# Patient Record
Sex: FEMALE | Race: WHITE
[De-identification: names, ages, dates, MRNs, and addresses within clinical notes are randomized per-mention and may not be internally consistent; named-entity substitution may affect disease eponyms.]

---

## 2017-09-27 ENCOUNTER — HOSPITAL ENCOUNTER (OUTPATIENT)
Dept: HOSPITAL 75 - ONC | Age: 42
LOS: 3 days | Discharge: HOME | End: 2017-09-30
Attending: INTERNAL MEDICINE
Payer: COMMERCIAL

## 2017-09-27 LAB
%SAT TOTAL IRON BINDING CAPIC: 3 % (ref 15–50)
ALBUMIN SERPL-MCNC: 3.9 GM/DL (ref 3.2–4.5)
ALT SERPL-CCNC: 10 U/L (ref 0–55)
ANION GAP SERPL CALC-SCNC: 10 MMOL/L (ref 5–14)
AST SERPL-CCNC: 8 U/L (ref 5–34)
BASOPHILS # BLD AUTO: 0 10^3/UL (ref 0–0.1)
BASOPHILS NFR BLD AUTO: 0 % (ref 0–10)
BILIRUB SERPL-MCNC: 0.3 MG/DL (ref 0.1–1)
BUN SERPL-MCNC: 16 MG/DL (ref 7–18)
BUN/CREAT SERPL: 20
CALCIUM SERPL-MCNC: 9.2 MG/DL (ref 8.5–10.1)
CHLORIDE SERPL-SCNC: 103 MMOL/L (ref 98–107)
CO2 SERPL-SCNC: 23 MMOL/L (ref 21–32)
CREAT SERPL-MCNC: 0.81 MG/DL (ref 0.6–1.3)
EOSINOPHIL # BLD AUTO: 0.2 10^3/UL (ref 0–0.3)
EOSINOPHIL NFR BLD AUTO: 3 % (ref 0–10)
ERYTHROCYTE [DISTWIDTH] IN BLOOD BY AUTOMATED COUNT: 17.2 % (ref 10–14.5)
GFR SERPLBLD BASED ON 1.73 SQ M-ARVRAT: > 60 ML/MIN
GLUCOSE SERPL-MCNC: 205 MG/DL (ref 70–105)
LYMPHOCYTES # BLD AUTO: 1.3 X 10^3 (ref 1–4)
LYMPHOCYTES NFR BLD AUTO: 17 % (ref 12–44)
MCH RBC QN AUTO: 20 PG (ref 25–34)
MCHC RBC AUTO-ENTMCNC: 29 G/DL (ref 32–36)
MCV RBC AUTO: 68 FL (ref 80–99)
MONOCYTES # BLD AUTO: 0.6 X 10^3 (ref 0–1)
MONOCYTES NFR BLD AUTO: 7 % (ref 0–12)
NEUTROPHILS # BLD AUTO: 5.9 X 10^3 (ref 1.8–7.8)
NEUTROPHILS NFR BLD AUTO: 73 % (ref 42–75)
PLATELET # BLD: 362 10^3/UL (ref 130–400)
PMV BLD AUTO: 9.5 FL (ref 7.4–10.4)
POTASSIUM SERPL-SCNC: 4.1 MMOL/L (ref 3.6–5)
PROT SERPL-MCNC: 7.1 GM/DL (ref 6.4–8.2)
RBC # BLD AUTO: 3.51 10^6/UL (ref 4.35–5.85)
SODIUM SERPL-SCNC: 136 MMOL/L (ref 135–145)
TIBC SERPL-MCNC: 390 UG/DL (ref 280–380)
WBC # BLD AUTO: 8.1 10^3/UL (ref 4.3–11)

## 2017-09-27 PROCEDURE — 36415 COLL VENOUS BLD VENIPUNCTURE: CPT

## 2017-09-27 PROCEDURE — 85025 COMPLETE CBC W/AUTO DIFF WBC: CPT

## 2017-09-27 PROCEDURE — 99214 OFFICE O/P EST MOD 30 MIN: CPT

## 2017-09-27 PROCEDURE — 80053 COMPREHEN METABOLIC PANEL: CPT

## 2017-09-27 PROCEDURE — 82728 ASSAY OF FERRITIN: CPT

## 2017-09-27 PROCEDURE — 86304 IMMUNOASSAY TUMOR CA 125: CPT

## 2017-09-27 PROCEDURE — 83540 ASSAY OF IRON: CPT

## 2017-09-28 LAB
FERRITIN SERPL-MCNC: 14 NG/ML (ref 15–150)
UIBC SERPL-MCNC: 380 UG/DL (ref 55–450)

## 2017-10-05 ENCOUNTER — HOSPITAL ENCOUNTER (OUTPATIENT)
Dept: HOSPITAL 75 - PREOP | Age: 42
End: 2017-10-05
Attending: SURGERY
Payer: MEDICAID

## 2017-10-05 VITALS — HEIGHT: 67 IN | WEIGHT: 283 LBS | BODY MASS INDEX: 44.42 KG/M2

## 2017-10-05 DIAGNOSIS — Z01.818: Primary | ICD-10-CM

## 2017-10-05 DIAGNOSIS — C53.9: ICD-10-CM

## 2017-10-06 ENCOUNTER — HOSPITAL ENCOUNTER (OUTPATIENT)
Dept: HOSPITAL 75 - SDC | Age: 42
Discharge: HOME | End: 2017-10-06
Attending: SURGERY
Payer: MEDICAID

## 2017-10-06 VITALS — DIASTOLIC BLOOD PRESSURE: 91 MMHG | SYSTOLIC BLOOD PRESSURE: 143 MMHG

## 2017-10-06 VITALS — SYSTOLIC BLOOD PRESSURE: 145 MMHG | DIASTOLIC BLOOD PRESSURE: 83 MMHG

## 2017-10-06 VITALS — HEIGHT: 67 IN | WEIGHT: 283 LBS | BODY MASS INDEX: 44.42 KG/M2

## 2017-10-06 VITALS — SYSTOLIC BLOOD PRESSURE: 127 MMHG | DIASTOLIC BLOOD PRESSURE: 72 MMHG

## 2017-10-06 DIAGNOSIS — F17.210: ICD-10-CM

## 2017-10-06 DIAGNOSIS — E66.01: ICD-10-CM

## 2017-10-06 DIAGNOSIS — C53.9: Primary | ICD-10-CM

## 2017-10-06 DIAGNOSIS — D64.9: ICD-10-CM

## 2017-10-06 DIAGNOSIS — G47.33: ICD-10-CM

## 2017-10-06 DIAGNOSIS — D23.71: ICD-10-CM

## 2017-10-06 DIAGNOSIS — E11.9: ICD-10-CM

## 2017-10-06 DIAGNOSIS — Z79.899: ICD-10-CM

## 2017-10-06 PROCEDURE — 84703 CHORIONIC GONADOTROPIN ASSAY: CPT

## 2017-10-06 PROCEDURE — 71010: CPT

## 2017-10-06 PROCEDURE — 87081 CULTURE SCREEN ONLY: CPT

## 2017-10-06 NOTE — DIAGNOSTIC IMAGING REPORT
CLINICAL INDICATION: Postop Groshong placement.



EXAM: Portable chest x-ray upright view.



COMPARISONS: None.



FINDINGS:

There is a Groshong central line seen overlying right chest with

tip in the mid superior vena cava.



Lungs/pleura: There is a curvilinear amorphous area of increased

density overlying the periphery of the left midlung field which

appears to extend outside the confines of the chest wall that may

be external to patient and representing artifact.



Otherwise, lungs are clear. There is no pneumothorax. There is no

pleural effusion.



Mediastinum: Unremarkable. 



Pulmonary vasculature: Unremarkable.



Heart: Unremarkable.



Bones/extrathoracic soft tissue: Unremarkable.



IMPRESSION:

1: There is a Groshong central line catheter overlying right

chest with tip in the mid superior vena cava. There is no

pneumothorax.



2: There is no radiographic evidence of acute cardiopulmonary

process.



3: There is a curvilinear amorphous area of increased density

overlying the periphery of the left mid lung field, which appears

to extend outside the confines of the chest wall that may be

external to patient and represented artifact.



Dictated by: 



  Dictated on workstation # [id number]

## 2017-10-06 NOTE — PROGRESS NOTE-POST OPERATIVE
Post-Operative Progess Note


Surgeon (s)/Assistant (s)


Surgeon


EDWIN RUSSELL DO


Assistant:  na





Pre-Operative Diagnosis


cervical cancer, right lower ext skin lesion





Post-Operative Diagnosis





same





Procedure & Operative Findings


Date of Procedure


10/6/17


Procedure Performed/Findings


port placement u/s guidance right IJ, excision skin lesion right lower 

extremity 2.5x1.5 cm


Anesthesia Type


mac c local





Estimated Blood Loss


Estimated blood loss (mL):  min





Specimens/Packing


Specimens Removed


skin lesion short suture superior long suture EDWIN Rivera DO Oct 6, 2017 08:53

## 2017-10-06 NOTE — DIAGNOSTIC IMAGING REPORT
Intraoperative view of the chest.



INDICATION: Port placement.



Fluoroscopic time provided is 48 seconds.



IMPRESSION: Provided image demonstrates a right IJ port is seen

with tip at the SVC level.



Dictated by: 



  Dictated on workstation # [id number]

## 2017-10-06 NOTE — XMS REPORT
Patient Summary (HL7 CCD)

 Created on: 02/15/2017



VIANNEY CUNHA

External Reference #: [id number]

: 1975

Sex: Female



Demographics







 Address  [address] [place]  [address]

 

 Home Phone  [phone]

 

 Preferred Language  Unknown

 

 Marital Status  Unknown

 

 [employer] Affiliation  Unknown

 

 Race  White

 

 Ethnic Group  Not  or 





Author







 Author  BRIANA RODRIGUEZ  Unknown

 

 Address  [address]

[place]  [address]



 

 Phone  [phone]







Care Team Providers







 Care Team Member Name  Role  Phone

 

 SERGEI DOUGLAS, CB UMAÑA  Attphys  [phone]

 

 SERGEI DOUGLAS, CB Miguel  [phone]



                                                                



Allergies

          





 Allergy        Code        Allergy Type        Reaction        Status    

 

 No Known Drug Allergies        0        Drug allergy                 Active    



                                                                               
         



Active Medications

          Unknown or Not Available.                                            
                                            



Problems

          Unknown or Not Available.                                            
                                            



Procedures

          





 Procedure        Code        Procedure Type        Date    

 

 SHOULDER MINIMUM 2 VIEWS        [id number]        SNOMED CT        10/21/2016    

 

 BEDSIDE GLUCOSE        [id number]        SNOMED CT        10/21/2016    



                                                                               
                   



Results

          





 BEDSIDE GLUCOSE - Collect Date/Time: 10/21/2016 01:41     

 

 Test Name        Code        Test Result        Test Units        Test Ref 
Range    

 

 GLUCOSE POCT                 181        MG/DL        L=70       H=100    



                                                                               
         



Function Status

          Unknown or Not Available.                                            
                                  



History of Immunizations

          Unknown or Not Available.                                            
                        



Plan of Treatment

          Unknown or Not Available.                                            
                        



Social History

          





 Smoking Status        Code        Start Date        End Date    

 

 Current every day smoker        [id number]                      



                                                                               
         



Vital Signs

          Unknown or Not Available.                                            
                        



Function Status

          Unknown or Not Available.                                            
    



Goals

          Unknown or Not Available.                                            
              



ASSESSMENTS

          Unknown or Not Available.                                            
                        



Health Concerns Section

          Unknown or Not Available.

## 2017-10-06 NOTE — DISCHARGE INST-SIMPLE/STANDARD
Discharge Inst-Standard


Patient Instructions/Follow Up


Plan of Care/Instructions/FU:  


12-14 days for suture removal-Deinse


Activity as Tolerated:  No


Discharge Diet:  Regular Diet


Other Inst to Patient


Follow up Appt:


Make appointment for 12-14 days





Instructions:


No lifting greater than 10 pounds.


No strenuous activity. 


May shower in 24 hours, no tub bath or soaking.


Use incentive spirometer at home as directed.


No Smoking





Skin/Wound Care:


May remove bandages in 48 hours.  You have special glue over incisions it will 

fall off on its own.





Symptoms to Report:


Appetite Changes, Extremity Discoloration, Numbness/Tingling, Swelling Increased

, Bleeding Excessive, Eyesight Changes, Pain Increased, Urine Color Change, 

Constipation(Persistent), Fever over 101 degree F, Pain/Pressure in chest, 

Urinating Difficulty, Cough Up/Vomit Blood, Heart Beat Irreg/Pounding, Pain/

Pressure in jaw, Vaginal Bleeding Increase, Cramps in feet or legs, 

Lightheadedness, Pain/Pressure in shoulder, Diarrhea(Persistent), Memory 

Changes Suddenly, Questions/Concerns, Weight gain consecutive days, Dizziness/

Fainting, Nausea/Vomiting, Shortness of Breath, Weight gain over 2 pounds








If questions or concerns contact your physician 


Or seek help at emergency department.











EDWIN RUSSELL DO Oct 6, 2017 08:58

## 2017-10-06 NOTE — PROGRESS NOTE-PRE OPERATIVE
Pre-Operative Progress Note


H&P Reviewed


The H&P was reviewed, patient examined and no changes noted.


Date Seen by Provider:  Oct 6, 2017


Time Seen by Provider:  07:26


Date H&P Reviewed:  Oct 6, 2017


Time H&P Reviewed:  07:26


Pre-Operative Diagnosis:  cervical cancer, right lower ext skin lesion











EDWIN RUSSELL DO Oct 6, 2017 07:26

## 2017-10-07 NOTE — OPERATIVE REPORT
DATE OF SERVICE:  10/06/2017



PREOPERATIVE DIAGNOSIS:

Cervical cancer and skin lesion, right lower extremity.



POSTOPERATIVE DIAGNOSIS:

Cervical cancer and skin lesion, right lower extremity.



PROCEDURE:

Port placement under ultrasound guidance right internal jugular vein and

excision of skin lesion 2.5 x 1.5 cm.



SURGEON:

Edwin Walker DO



ANESTHESIA:

Per CRNA, MAC with local.



ESTIMATED BLOOD LOSS:

Minimal.



COMPLICATIONS:

None.



INDICATIONS:

The patient is a 42-year-old female with recent diagnosis of cervical cancer. 

She is undergoing chemotherapy treatment and needed port placement.  She also

has a skin lesion to the right lower extremity, she would like to have removed. 

She understands risks and benefits of above procedures and wished to proceed

with procedure.  Consent was signed in the chart.



DESCRIPTION OF PROCEDURE:

The patient was taken to the operating suite.  She was prepped and draped in

sterile fashion.  Surgical pause was performed.  Using ultrasound, the right

internal jugular vein was located.  A micro access needle was used to access the

right internal jugular vein, nonpulsatile, dark blood was withdrawn.  The

syringe was removed.  The micro access wire was inserted and the needle was

removed.  Fluoroscopy assured proper placement.  An 11 blade scalpel was used to

make a stab incision at the insertion site of the wire.  A micro access dilator

sheath was then advanced over the guidewire and the dilator and wire were

removed.  The regular guidewire was then placed through the sheath and the

sheath was then removed.  Fluoroscopy assured proper placement.  The wire was

then secured.  Local anesthetic was used to infiltrate the neck and the right

chest in order for pocket creation and tunneling.  A #15 blade scalpel was used

to make a skin incision and dissect down through the subcutaneous tissues and

the pocket was then created on the right chest with both sharp and blunt

dissection.  The dilator sheath was then advanced over the guidewire under

fluoroscopy.  The wire and dilator were removed.  The Groshong catheter was then

inserted through the sheath and the sheath was removed.  The Groshong wire was

then removed.  The catheter was then tunneled to the right chest and Fluoroscopy

was used to cut to length attached to the port and then placed within the pocket

that was created.  The port was then accessed without difficulty.  It was then

flushed with saline and then heparin.  The subcutaneous tissues were then

reapproximated using 3-0 Vicryl.  The skin was then closed using 4-0 Vicryl in a

running subcuticular fashion.  Dermabond was placed over the incisions.  The

right lower extremity was then prepped and draped in sterile fashion.  Local

anesthetic was infiltrated into the area.  The #15 blade scalpel was used to

make an incision around the lesion measuring 2.5 x 1.5 cm.  The skin and

subcutaneous tissue was removed.  The specimen was labeled with a short suture

superior and long suture inferiorly.  The skin was then closed using 3-0 nylon

in a running fashion.  The area was then washed and dried, sterile bandage was

applied.  The patient tolerated the procedure well without any complications. 

She was taken to the recovery room in stable condition.  Chest x-ray pending.





Job ID: [id number]

DocumentID: [id number]

Dictated Date:  10/06/2017 13:08:55

Transcription Date: 10/06/2017 23:03:57

Dictated By: EDWIN WALKER DO

## 2017-10-10 LAB
BASOPHILS # BLD AUTO: 0 10^3/UL (ref 0–0.1)
BASOPHILS NFR BLD AUTO: 1 % (ref 0–10)
EOSINOPHIL # BLD AUTO: 0.2 10^3/UL (ref 0–0.3)
EOSINOPHIL NFR BLD AUTO: 3 % (ref 0–10)
ERYTHROCYTE [DISTWIDTH] IN BLOOD BY AUTOMATED COUNT: 17.3 % (ref 10–14.5)
HCT VFR BLD CALC: 26 % (ref 35–52)
HGB BLD-MCNC: 7.4 G/DL (ref 11.5–16)
LYMPHOCYTES # BLD AUTO: 1.5 X 10^3 (ref 1–4)
LYMPHOCYTES NFR BLD AUTO: 19 % (ref 12–44)
MANUAL DIFFERENTIAL PERFORMED BLD QL: NO
MCH RBC QN AUTO: 19 PG (ref 25–34)
MCHC RBC AUTO-ENTMCNC: 28 G/DL (ref 32–36)
MCV RBC AUTO: 67 FL (ref 80–99)
MONOCYTES # BLD AUTO: 0.6 X 10^3 (ref 0–1)
MONOCYTES NFR BLD AUTO: 8 % (ref 0–12)
NEUTROPHILS # BLD AUTO: 5.4 X 10^3 (ref 1.8–7.8)
NEUTROPHILS NFR BLD AUTO: 70 % (ref 42–75)
PLATELET # BLD: 316 10^3/UL (ref 130–400)
PMV BLD AUTO: 9.3 FL (ref 7.4–10.4)
RBC # BLD AUTO: 3.87 10^6/UL (ref 4.35–5.85)
WBC # BLD AUTO: 7.8 10^3/UL (ref 4.3–11)

## 2017-10-17 ENCOUNTER — HOSPITAL ENCOUNTER (OUTPATIENT)
Dept: HOSPITAL 75 - RAD | Age: 42
End: 2017-10-17
Attending: INTERNAL MEDICINE
Payer: MEDICAID

## 2017-10-17 DIAGNOSIS — C53.9: Primary | ICD-10-CM

## 2017-10-24 ENCOUNTER — HOSPITAL ENCOUNTER (OUTPATIENT)
Dept: HOSPITAL 75 - RAD | Age: 42
End: 2017-10-24
Attending: INTERNAL MEDICINE
Payer: MEDICAID

## 2017-10-24 DIAGNOSIS — K43.9: ICD-10-CM

## 2017-10-24 DIAGNOSIS — C77.5: ICD-10-CM

## 2017-10-24 DIAGNOSIS — C53.9: Primary | ICD-10-CM

## 2017-10-24 NOTE — DIAGNOSTIC IMAGING REPORT
EXAMINATION: PET-CT 



TECHNIQUE: 

Serum glucose level at the time of the study is: 173 mg/dL.

 12.8 mCi of FDG was administered intravenously followed by

obtaining PET images with corresponding noncontrast CT scan

images. The CT scan was performed for anatomic correlation and

attenuation correction and was not performed according to the

diagnostic protocol of the areas covered. The scan was performed

from the head to mid thighs.



INDICATION: Cervical cancer.



FINDINGS:

There is symmetric FDG uptake in the brain.



No suspicious hypermetabolic mass is seen in the neck. No

significant hypermetabolism is seen in the chest.



In the abdomen and pelvis, there is a physiologic activity

related to excretion of the tracer in the renal collecting

system. There is a ureteric stent on the left side.



There is a hypermetabolic left periaortic enlarged lymph node

measuring 3.5 x 3.4 cm. Maximum SUV is 8.



There is also a 5.6 x 2.8 cm hypermetabolic enlarged lymph node

centered between the left internal and external iliac arteries.

Maximum SUV is 6.5.



Prominent increased FDG uptake is seen centered in the area of

the cervix which shows nonspecific fullness on the localizer CT

scan and the area of increased FDG uptake is estimated to extend

about 7.7 x 7.7 cm in maximum axial dimension. Maximum SUV is 11.



There is hypermetabolism in the anterior abdominal wall

inferiorly along a ventral hernia that has a wide neck. The

activity is along the inferior aspect of the bowel loop within

the hernia and is uncertain if it relates to the bowel wall or

adjacent soft tissue mass. A single omental metastasis in this

location is unusual and this is favored to relate to incidental

physiologic bowel activity. The maximum SUV at this location is

6.7.



IMPRESSION:

1. Hypermetabolic mass centered in the cervix with evidence of

neoplastic spread into the left internal iliac and left

para-aortic hypermetabolic lymph nodes are seen.

2. Indeterminate mild increased FDG uptake along the inferior

aspect of a ventral hernia near a bowel loop. This is favored to

be physiologic activity or related to inflammation rather than a

single omental metastasis. Followup studies recommended.



Dictated by: 



  Dictated on workstation # [id number]

## 2017-10-26 LAB
BASOPHILS # BLD AUTO: 0 10^3/UL (ref 0–0.1)
BASOPHILS NFR BLD AUTO: 0 % (ref 0–10)
EOSINOPHIL # BLD AUTO: 0.2 10^3/UL (ref 0–0.3)
EOSINOPHIL NFR BLD AUTO: 3 % (ref 0–10)
ERYTHROCYTE [DISTWIDTH] IN BLOOD BY AUTOMATED COUNT: 18.4 % (ref 10–14.5)
HCT VFR BLD CALC: 27 % (ref 35–52)
HGB BLD-MCNC: 8 G/DL (ref 11.5–16)
LYMPHOCYTES # BLD AUTO: 1.5 X 10^3 (ref 1–4)
LYMPHOCYTES NFR BLD AUTO: 20 % (ref 12–44)
MANUAL DIFFERENTIAL PERFORMED BLD QL: NO
MCH RBC QN AUTO: 20 PG (ref 25–34)
MCHC RBC AUTO-ENTMCNC: 29 G/DL (ref 32–36)
MCV RBC AUTO: 67 FL (ref 80–99)
MONOCYTES # BLD AUTO: 0.6 X 10^3 (ref 0–1)
MONOCYTES NFR BLD AUTO: 7 % (ref 0–12)
NEUTROPHILS # BLD AUTO: 5.4 X 10^3 (ref 1.8–7.8)
NEUTROPHILS NFR BLD AUTO: 70 % (ref 42–75)
PLATELET # BLD: 316 10^3/UL (ref 130–400)
PMV BLD AUTO: 9.3 FL (ref 7.4–10.4)
RBC # BLD AUTO: 4.07 10^6/UL (ref 4.35–5.85)
WBC # BLD AUTO: 7.7 10^3/UL (ref 4.3–11)

## 2017-11-06 LAB
BASOPHILS # BLD AUTO: 0 10^3/UL (ref 0–0.1)
BASOPHILS NFR BLD AUTO: 0 % (ref 0–10)
BUN/CREAT SERPL: 24
CALCIUM SERPL-MCNC: 9.4 MG/DL (ref 8.5–10.1)
CHLORIDE SERPL-SCNC: 103 MMOL/L (ref 98–107)
CO2 SERPL-SCNC: 22 MMOL/L (ref 21–32)
CREAT SERPL-MCNC: 0.74 MG/DL (ref 0.6–1.3)
EOSINOPHIL # BLD AUTO: 0.1 10^3/UL (ref 0–0.3)
EOSINOPHIL NFR BLD AUTO: 2 % (ref 0–10)
ERYTHROCYTE [DISTWIDTH] IN BLOOD BY AUTOMATED COUNT: 20.4 % (ref 10–14.5)
GFR SERPLBLD BASED ON 1.73 SQ M-ARVRAT: > 60 ML/MIN
GLUCOSE SERPL-MCNC: 227 MG/DL (ref 70–105)
HCT VFR BLD CALC: 26 % (ref 35–52)
HGB BLD-MCNC: 7.6 G/DL (ref 11.5–16)
LYMPHOCYTES # BLD AUTO: 0.5 X 10^3 (ref 1–4)
LYMPHOCYTES NFR BLD AUTO: 7 % (ref 12–44)
MAGNESIUM SERPL-MCNC: 1.6 MG/DL (ref 1.8–2.4)
MANUAL DIFFERENTIAL PERFORMED BLD QL: NO
MCH RBC QN AUTO: 20 PG (ref 25–34)
MCHC RBC AUTO-ENTMCNC: 29 G/DL (ref 32–36)
MCV RBC AUTO: 68 FL (ref 80–99)
MONOCYTES # BLD AUTO: 0.4 X 10^3 (ref 0–1)
MONOCYTES NFR BLD AUTO: 7 % (ref 0–12)
NEUTROPHILS # BLD AUTO: 5.2 X 10^3 (ref 1.8–7.8)
NEUTROPHILS NFR BLD AUTO: 84 % (ref 42–75)
PLATELET # BLD: 276 10^3/UL (ref 130–400)
PMV BLD AUTO: 9.3 FL (ref 7.4–10.4)
POTASSIUM SERPL-SCNC: 4.1 MMOL/L (ref 3.6–5)
RBC # BLD AUTO: 3.85 10^6/UL (ref 4.35–5.85)
SODIUM SERPL-SCNC: 135 MMOL/L (ref 135–145)
WBC # BLD AUTO: 6.2 10^3/UL (ref 4.3–11)

## 2017-11-13 LAB
ALBUMIN SERPL-MCNC: 3.3 GM/DL (ref 3.2–4.5)
ALP SERPL-CCNC: 55 U/L (ref 40–136)
ALT SERPL-CCNC: 14 U/L (ref 0–55)
BASOPHILS # BLD AUTO: 0 10^3/UL (ref 0–0.1)
BASOPHILS NFR BLD AUTO: 0 % (ref 0–10)
BILIRUB SERPL-MCNC: 0.2 MG/DL (ref 0.1–1)
BUN/CREAT SERPL: 19
CALCIUM SERPL-MCNC: 8.8 MG/DL (ref 8.5–10.1)
CHLORIDE SERPL-SCNC: 101 MMOL/L (ref 98–107)
CO2 SERPL-SCNC: 23 MMOL/L (ref 21–32)
CREAT SERPL-MCNC: 0.77 MG/DL (ref 0.6–1.3)
EOSINOPHIL # BLD AUTO: 0.1 10^3/UL (ref 0–0.3)
EOSINOPHIL NFR BLD AUTO: 2 % (ref 0–10)
ERYTHROCYTE [DISTWIDTH] IN BLOOD BY AUTOMATED COUNT: 23.8 % (ref 10–14.5)
GFR SERPLBLD BASED ON 1.73 SQ M-ARVRAT: > 60 ML/MIN
GLUCOSE SERPL-MCNC: 278 MG/DL (ref 70–105)
HCT VFR BLD CALC: 25 % (ref 35–52)
HGB BLD-MCNC: 7.3 G/DL (ref 11.5–16)
LYMPHOCYTES # BLD AUTO: 0.3 X 10^3 (ref 1–4)
LYMPHOCYTES NFR BLD AUTO: 8 % (ref 12–44)
MANUAL DIFFERENTIAL PERFORMED BLD QL: NO
MCH RBC QN AUTO: 21 PG (ref 25–34)
MCHC RBC AUTO-ENTMCNC: 30 G/DL (ref 32–36)
MCV RBC AUTO: 71 FL (ref 80–99)
MONOCYTES # BLD AUTO: 0.4 X 10^3 (ref 0–1)
MONOCYTES NFR BLD AUTO: 9 % (ref 0–12)
NEUTROPHILS # BLD AUTO: 3.1 X 10^3 (ref 1.8–7.8)
NEUTROPHILS NFR BLD AUTO: 81 % (ref 42–75)
PLATELET # BLD: 169 10^3/UL (ref 130–400)
PMV BLD AUTO: 8.8 FL (ref 7.4–10.4)
POTASSIUM SERPL-SCNC: 3.9 MMOL/L (ref 3.6–5)
PROT SERPL-MCNC: 6 GM/DL (ref 6.4–8.2)
RBC # BLD AUTO: 3.49 10^6/UL (ref 4.35–5.85)
SODIUM SERPL-SCNC: 132 MMOL/L (ref 135–145)
WBC # BLD AUTO: 3.9 10^3/UL (ref 4.3–11)

## 2017-11-20 LAB
ALBUMIN SERPL-MCNC: 3.5 GM/DL (ref 3.2–4.5)
ALP SERPL-CCNC: 62 U/L (ref 40–136)
ALT SERPL-CCNC: 14 U/L (ref 0–55)
BASOPHILS # BLD AUTO: 0 10^3/UL (ref 0–0.1)
BASOPHILS NFR BLD AUTO: 0 % (ref 0–10)
BILIRUB SERPL-MCNC: 0.4 MG/DL (ref 0.1–1)
BUN/CREAT SERPL: 22
CALCIUM SERPL-MCNC: 9.5 MG/DL (ref 8.5–10.1)
CHLORIDE SERPL-SCNC: 102 MMOL/L (ref 98–107)
CO2 SERPL-SCNC: 22 MMOL/L (ref 21–32)
CREAT SERPL-MCNC: 0.73 MG/DL (ref 0.6–1.3)
EOSINOPHIL # BLD AUTO: 0.1 10^3/UL (ref 0–0.3)
EOSINOPHIL NFR BLD AUTO: 2 % (ref 0–10)
ERYTHROCYTE [DISTWIDTH] IN BLOOD BY AUTOMATED COUNT: 26.8 % (ref 10–14.5)
GFR SERPLBLD BASED ON 1.73 SQ M-ARVRAT: > 60 ML/MIN
GLUCOSE SERPL-MCNC: 228 MG/DL (ref 70–105)
HCT VFR BLD CALC: 27 % (ref 35–52)
HGB BLD-MCNC: 8.4 G/DL (ref 11.5–16)
LYMPHOCYTES # BLD AUTO: 0.2 X 10^3 (ref 1–4)
LYMPHOCYTES NFR BLD AUTO: 6 % (ref 12–44)
MANUAL DIFFERENTIAL PERFORMED BLD QL: NO
MCH RBC QN AUTO: 22 PG (ref 25–34)
MCHC RBC AUTO-ENTMCNC: 31 G/DL (ref 32–36)
MCV RBC AUTO: 72 FL (ref 80–99)
MONOCYTES # BLD AUTO: 0.3 X 10^3 (ref 0–1)
MONOCYTES NFR BLD AUTO: 8 % (ref 0–12)
NEUTROPHILS # BLD AUTO: 3.5 X 10^3 (ref 1.8–7.8)
NEUTROPHILS NFR BLD AUTO: 84 % (ref 42–75)
PLATELET # BLD: 134 10^3/UL (ref 130–400)
PMV BLD AUTO: 8.8 FL (ref 7.4–10.4)
POTASSIUM SERPL-SCNC: 4.3 MMOL/L (ref 3.6–5)
PROT SERPL-MCNC: 6.7 GM/DL (ref 6.4–8.2)
RBC # BLD AUTO: 3.79 10^6/UL (ref 4.35–5.85)
SODIUM SERPL-SCNC: 136 MMOL/L (ref 135–145)
WBC # BLD AUTO: 4.2 10^3/UL (ref 4.3–11)

## 2017-11-27 LAB
ALBUMIN SERPL-MCNC: 3.5 GM/DL (ref 3.2–4.5)
ALP SERPL-CCNC: 65 U/L (ref 40–136)
ALT SERPL-CCNC: 16 U/L (ref 0–55)
BASOPHILS # BLD AUTO: 0 10^3/UL (ref 0–0.1)
BASOPHILS NFR BLD AUTO: 1 % (ref 0–10)
BILIRUB SERPL-MCNC: 0.4 MG/DL (ref 0.1–1)
BUN/CREAT SERPL: 25
CALCIUM SERPL-MCNC: 9.4 MG/DL (ref 8.5–10.1)
CHLORIDE SERPL-SCNC: 101 MMOL/L (ref 98–107)
CO2 SERPL-SCNC: 24 MMOL/L (ref 21–32)
CREAT SERPL-MCNC: 0.69 MG/DL (ref 0.6–1.3)
EOSINOPHIL # BLD AUTO: 0 10^3/UL (ref 0–0.3)
EOSINOPHIL NFR BLD AUTO: 1 % (ref 0–10)
ERYTHROCYTE [DISTWIDTH] IN BLOOD BY AUTOMATED COUNT: 28.6 % (ref 10–14.5)
GFR SERPLBLD BASED ON 1.73 SQ M-ARVRAT: > 60 ML/MIN
GLUCOSE SERPL-MCNC: 307 MG/DL (ref 70–105)
HCT VFR BLD CALC: 27 % (ref 35–52)
HGB BLD-MCNC: 8.5 G/DL (ref 11.5–16)
LYMPHOCYTES # BLD AUTO: 0.2 X 10^3 (ref 1–4)
LYMPHOCYTES NFR BLD AUTO: 6 % (ref 12–44)
MAGNESIUM SERPL-MCNC: 1.4 MG/DL (ref 1.8–2.4)
MANUAL DIFFERENTIAL PERFORMED BLD QL: NO
MCH RBC QN AUTO: 23 PG (ref 25–34)
MCHC RBC AUTO-ENTMCNC: 32 G/DL (ref 32–36)
MCV RBC AUTO: 73 FL (ref 80–99)
MONOCYTES # BLD AUTO: 0.2 X 10^3 (ref 0–1)
MONOCYTES NFR BLD AUTO: 7 % (ref 0–12)
NEUTROPHILS # BLD AUTO: 2.9 X 10^3 (ref 1.8–7.8)
NEUTROPHILS NFR BLD AUTO: 86 % (ref 42–75)
PLATELET # BLD: 103 10^3/UL (ref 130–400)
PMV BLD AUTO: 8.7 FL (ref 7.4–10.4)
POTASSIUM SERPL-SCNC: 4.4 MMOL/L (ref 3.6–5)
PROT SERPL-MCNC: 6.8 GM/DL (ref 6.4–8.2)
RBC # BLD AUTO: 3.7 10^6/UL (ref 4.35–5.85)
SODIUM SERPL-SCNC: 134 MMOL/L (ref 135–145)
WBC # BLD AUTO: 3.4 10^3/UL (ref 4.3–11)

## 2017-12-04 LAB
ALBUMIN SERPL-MCNC: 3.4 GM/DL (ref 3.2–4.5)
ALP SERPL-CCNC: 70 U/L (ref 40–136)
ALT SERPL-CCNC: 19 U/L (ref 0–55)
BASOPHILS # BLD AUTO: 0 10^3/UL (ref 0–0.1)
BASOPHILS NFR BLD AUTO: 0 % (ref 0–10)
BILIRUB SERPL-MCNC: 0.5 MG/DL (ref 0.1–1)
BUN/CREAT SERPL: 18
CALCIUM SERPL-MCNC: 8.6 MG/DL (ref 8.5–10.1)
CHLORIDE SERPL-SCNC: 100 MMOL/L (ref 98–107)
CO2 SERPL-SCNC: 24 MMOL/L (ref 21–32)
CREAT SERPL-MCNC: 0.78 MG/DL (ref 0.6–1.3)
EOSINOPHIL # BLD AUTO: 0 10^3/UL (ref 0–0.3)
EOSINOPHIL NFR BLD AUTO: 1 % (ref 0–10)
ERYTHROCYTE [DISTWIDTH] IN BLOOD BY AUTOMATED COUNT: 30 % (ref 10–14.5)
GFR SERPLBLD BASED ON 1.73 SQ M-ARVRAT: > 60 ML/MIN
GLUCOSE SERPL-MCNC: 354 MG/DL (ref 70–105)
HCT VFR BLD CALC: 26 % (ref 35–52)
HGB BLD-MCNC: 8.4 G/DL (ref 11.5–16)
LYMPHOCYTES # BLD AUTO: 0.2 X 10^3 (ref 1–4)
LYMPHOCYTES NFR BLD AUTO: 10 % (ref 12–44)
MANUAL DIFFERENTIAL PERFORMED BLD QL: NO
MCH RBC QN AUTO: 24 PG (ref 25–34)
MCHC RBC AUTO-ENTMCNC: 33 G/DL (ref 32–36)
MCV RBC AUTO: 74 FL (ref 80–99)
MONOCYTES # BLD AUTO: 0.2 X 10^3 (ref 0–1)
MONOCYTES NFR BLD AUTO: 8 % (ref 0–12)
NEUTROPHILS # BLD AUTO: 1.9 X 10^3 (ref 1.8–7.8)
NEUTROPHILS NFR BLD AUTO: 82 % (ref 42–75)
PLATELET # BLD: 103 10^3/UL (ref 130–400)
PMV BLD AUTO: 8.7 FL (ref 7.4–10.4)
POTASSIUM SERPL-SCNC: 3.9 MMOL/L (ref 3.6–5)
PROT SERPL-MCNC: 6.4 GM/DL (ref 6.4–8.2)
RBC # BLD AUTO: 3.47 10^6/UL (ref 4.35–5.85)
SODIUM SERPL-SCNC: 131 MMOL/L (ref 135–145)
WBC # BLD AUTO: 2.4 10^3/UL (ref 4.3–11)

## 2017-12-11 LAB
BASOPHILS # BLD AUTO: 0 10^3/UL (ref 0–0.1)
BASOPHILS NFR BLD AUTO: 0 % (ref 0–10)
EOSINOPHIL # BLD AUTO: 0 10^3/UL (ref 0–0.3)
EOSINOPHIL NFR BLD AUTO: 1 % (ref 0–10)
HCT VFR BLD CALC: 30 % (ref 35–52)
HGB BLD-MCNC: 9.9 G/DL (ref 11.5–16)
LYMPHOCYTES # BLD AUTO: 0.2 X 10^3 (ref 1–4)
LYMPHOCYTES NFR BLD AUTO: 8 % (ref 12–44)
MANUAL DIFFERENTIAL PERFORMED BLD QL: NO
MCH RBC QN AUTO: 26 PG (ref 25–34)
MCHC RBC AUTO-ENTMCNC: 33 G/DL (ref 32–36)
MCV RBC AUTO: 77 FL (ref 80–99)
MONOCYTES # BLD AUTO: 0.2 X 10^3 (ref 0–1)
MONOCYTES NFR BLD AUTO: 10 % (ref 0–12)
NEUTROPHILS # BLD AUTO: 1.9 X 10^3 (ref 1.8–7.8)
NEUTROPHILS NFR BLD AUTO: 81 % (ref 42–75)
PLATELET # BLD: 65 10^3/UL (ref 130–400)
PMV BLD AUTO: 9.1 FL (ref 7.4–10.4)
RBC # BLD AUTO: 3.84 10^6/UL (ref 4.35–5.85)
WBC # BLD AUTO: 2.3 10^3/UL (ref 4.3–11)

## 2018-01-02 LAB
BASOPHILS # BLD AUTO: 0 10^3/UL (ref 0–0.1)
BASOPHILS NFR BLD AUTO: 0 % (ref 0–10)
EOSINOPHIL # BLD AUTO: 0.1 10^3/UL (ref 0–0.3)
EOSINOPHIL NFR BLD AUTO: 1 % (ref 0–10)
ERYTHROCYTE [DISTWIDTH] IN BLOOD BY AUTOMATED COUNT: 26.2 % (ref 10–14.5)
HCT VFR BLD CALC: 31 % (ref 35–52)
HGB BLD-MCNC: 10.8 G/DL (ref 11.5–16)
LYMPHOCYTES # BLD AUTO: 0.6 X 10^3 (ref 1–4)
LYMPHOCYTES NFR BLD AUTO: 15 % (ref 12–44)
MANUAL DIFFERENTIAL PERFORMED BLD QL: NO
MCH RBC QN AUTO: 29 PG (ref 25–34)
MCHC RBC AUTO-ENTMCNC: 34 G/DL (ref 32–36)
MCV RBC AUTO: 85 FL (ref 80–99)
MONOCYTES # BLD AUTO: 0.5 X 10^3 (ref 0–1)
MONOCYTES NFR BLD AUTO: 13 % (ref 0–12)
NEUTROPHILS # BLD AUTO: 2.7 X 10^3 (ref 1.8–7.8)
NEUTROPHILS NFR BLD AUTO: 71 % (ref 42–75)
PLATELET # BLD: 157 10^3/UL (ref 130–400)
PMV BLD AUTO: 8.6 FL (ref 7.4–10.4)
RBC # BLD AUTO: 3.71 10^6/UL (ref 4.35–5.85)
WBC # BLD AUTO: 3.9 10^3/UL (ref 4.3–11)

## 2018-01-08 ENCOUNTER — HOSPITAL ENCOUNTER (OUTPATIENT)
Dept: HOSPITAL 75 - ONC | Age: 43
Discharge: HOME | End: 2018-01-08
Attending: INTERNAL MEDICINE
Payer: MEDICAID

## 2018-01-08 VITALS — WEIGHT: 265 LBS | BODY MASS INDEX: 42.59 KG/M2 | HEIGHT: 66 IN

## 2018-01-08 DIAGNOSIS — F17.210: ICD-10-CM

## 2018-01-08 DIAGNOSIS — E11.9: ICD-10-CM

## 2018-01-08 DIAGNOSIS — R59.0: ICD-10-CM

## 2018-01-08 DIAGNOSIS — Z79.899: ICD-10-CM

## 2018-01-08 DIAGNOSIS — D50.0: ICD-10-CM

## 2018-01-08 DIAGNOSIS — Z51.11: Primary | ICD-10-CM

## 2018-01-08 DIAGNOSIS — F32.9: ICD-10-CM

## 2018-01-08 DIAGNOSIS — C53.9: ICD-10-CM

## 2018-01-08 DIAGNOSIS — K86.9: ICD-10-CM

## 2018-01-08 DIAGNOSIS — Z51.0: ICD-10-CM

## 2018-01-08 DIAGNOSIS — E66.01: ICD-10-CM

## 2018-01-08 PROCEDURE — 83735 ASSAY OF MAGNESIUM: CPT

## 2018-01-08 PROCEDURE — 86901 BLOOD TYPING SEROLOGIC RH(D): CPT

## 2018-01-08 PROCEDURE — 36415 COLL VENOUS BLD VENIPUNCTURE: CPT

## 2018-01-08 PROCEDURE — 77338 DESIGN MLC DEVICE FOR IMRT: CPT

## 2018-01-08 PROCEDURE — 86900 BLOOD TYPING SEROLOGIC ABO: CPT

## 2018-01-08 PROCEDURE — 80053 COMPREHEN METABOLIC PANEL: CPT

## 2018-01-08 PROCEDURE — 77301 RADIOTHERAPY DOSE PLAN IMRT: CPT

## 2018-01-08 PROCEDURE — 77470 SPECIAL RADIATION TREATMENT: CPT

## 2018-01-08 PROCEDURE — 99215 OFFICE O/P EST HI 40 MIN: CPT

## 2018-01-08 PROCEDURE — 85027 COMPLETE CBC AUTOMATED: CPT

## 2018-01-08 PROCEDURE — 36593 DECLOT VASCULAR DEVICE: CPT

## 2018-01-08 PROCEDURE — 83540 ASSAY OF IRON: CPT

## 2018-01-08 PROCEDURE — 96413 CHEMO IV INFUSION 1 HR: CPT

## 2018-01-08 PROCEDURE — 77334 RADIATION TREATMENT AID(S): CPT

## 2018-01-08 PROCEDURE — 77336 RADIATION PHYSICS CONSULT: CPT

## 2018-01-08 PROCEDURE — 96374 THER/PROPH/DIAG INJ IV PUSH: CPT

## 2018-01-08 PROCEDURE — 86850 RBC ANTIBODY SCREEN: CPT

## 2018-01-08 PROCEDURE — 96375 TX/PRO/DX INJ NEW DRUG ADDON: CPT

## 2018-01-08 PROCEDURE — 77307 TELETHX ISODOSE PLAN CPLX: CPT

## 2018-01-08 PROCEDURE — 80048 BASIC METABOLIC PNL TOTAL CA: CPT

## 2018-01-08 PROCEDURE — 99213 OFFICE O/P EST LOW 20 MIN: CPT

## 2018-01-08 PROCEDURE — 77386: CPT

## 2018-01-08 PROCEDURE — 86304 IMMUNOASSAY TUMOR CA 125: CPT

## 2018-01-08 PROCEDURE — 82728 ASSAY OF FERRITIN: CPT

## 2018-01-08 PROCEDURE — 36430 TRANSFUSION BLD/BLD COMPNT: CPT

## 2018-01-08 PROCEDURE — 96415 CHEMO IV INFUSION ADDL HR: CPT

## 2018-01-08 PROCEDURE — 77417 THER RADIOLOGY PORT IMAGE(S): CPT

## 2018-01-08 PROCEDURE — 85025 COMPLETE CBC W/AUTO DIFF WBC: CPT

## 2018-01-08 PROCEDURE — 77385: CPT

## 2018-01-08 PROCEDURE — 96367 TX/PROPH/DG ADDL SEQ IV INF: CPT

## 2018-01-08 PROCEDURE — 36591 DRAW BLOOD OFF VENOUS DEVICE: CPT

## 2018-01-08 PROCEDURE — 86920 COMPATIBILITY TEST SPIN: CPT

## 2018-01-08 PROCEDURE — 77300 RADIATION THERAPY DOSE PLAN: CPT

## 2018-01-29 LAB
ALBUMIN SERPL-MCNC: 3.5 GM/DL (ref 3.2–4.5)
ALP SERPL-CCNC: 70 U/L (ref 40–136)
ALT SERPL-CCNC: 17 U/L (ref 0–55)
BASOPHILS # BLD AUTO: 0 10^3/UL (ref 0–0.1)
BASOPHILS NFR BLD AUTO: 0 % (ref 0–10)
BILIRUB SERPL-MCNC: 0.3 MG/DL (ref 0.1–1)
BUN/CREAT SERPL: 19
CALCIUM SERPL-MCNC: 9.1 MG/DL (ref 8.5–10.1)
CHLORIDE SERPL-SCNC: 104 MMOL/L (ref 98–107)
CO2 SERPL-SCNC: 26 MMOL/L (ref 21–32)
CREAT SERPL-MCNC: 0.75 MG/DL (ref 0.6–1.3)
EOSINOPHIL # BLD AUTO: 0.3 10^3/UL (ref 0–0.3)
EOSINOPHIL NFR BLD AUTO: 6 % (ref 0–10)
ERYTHROCYTE [DISTWIDTH] IN BLOOD BY AUTOMATED COUNT: 16.3 % (ref 10–14.5)
GFR SERPLBLD BASED ON 1.73 SQ M-ARVRAT: > 60 ML/MIN
GLUCOSE SERPL-MCNC: 215 MG/DL (ref 70–105)
HCT VFR BLD CALC: 34 % (ref 35–52)
HGB BLD-MCNC: 11.9 G/DL (ref 11.5–16)
LYMPHOCYTES # BLD AUTO: 0.5 X 10^3 (ref 1–4)
LYMPHOCYTES NFR BLD AUTO: 11 % (ref 12–44)
MANUAL DIFFERENTIAL PERFORMED BLD QL: NO
MCH RBC QN AUTO: 32 PG (ref 25–34)
MCHC RBC AUTO-ENTMCNC: 36 G/DL (ref 32–36)
MCV RBC AUTO: 89 FL (ref 80–99)
MONOCYTES # BLD AUTO: 0.4 X 10^3 (ref 0–1)
MONOCYTES NFR BLD AUTO: 9 % (ref 0–12)
NEUTROPHILS # BLD AUTO: 3.6 X 10^3 (ref 1.8–7.8)
NEUTROPHILS NFR BLD AUTO: 75 % (ref 42–75)
PLATELET # BLD: 150 10^3/UL (ref 130–400)
PMV BLD AUTO: 9.4 FL (ref 7.4–10.4)
POTASSIUM SERPL-SCNC: 4.6 MMOL/L (ref 3.6–5)
PROT SERPL-MCNC: 6.4 GM/DL (ref 6.4–8.2)
RBC # BLD AUTO: 3.78 10^6/UL (ref 4.35–5.85)
SODIUM SERPL-SCNC: 139 MMOL/L (ref 135–145)
WBC # BLD AUTO: 4.8 10^3/UL (ref 4.3–11)

## 2018-02-07 ENCOUNTER — HOSPITAL ENCOUNTER (OUTPATIENT)
Dept: HOSPITAL 75 - RAD | Age: 43
End: 2018-02-07
Attending: INTERNAL MEDICINE
Payer: MEDICAID

## 2018-02-07 DIAGNOSIS — C53.9: Primary | ICD-10-CM

## 2018-02-07 DIAGNOSIS — Z96.0: ICD-10-CM

## 2018-02-07 PROCEDURE — 74177 CT ABD & PELVIS W/CONTRAST: CPT

## 2018-02-07 PROCEDURE — 71260 CT THORAX DX C+: CPT

## 2018-02-07 NOTE — DIAGNOSTIC IMAGING REPORT
PROCEDURE: CT chest, abdomen, and pelvis with contrast.



TECHNIQUE: Multiple contiguous axial images were obtained through

the chest, abdomen, and pelvis after the administration of

intravenous contrast.



INDICATION: Carcinoma of the cervix.



There are no previous CT examinations available for comparison.



FINDINGS: The PET/CT exam of 10/24/2017 did note a large

hypermetabolic mass centered about the cervix with evidence of

neoplastic spread into the left internal iliac and left

periaortic nodes. There is also dilatation of the left ureter and

left renal pelvis. This was most likely due to partial

obstruction of the distal left ureter secondary to the mass

involving the cervix.



In the interval since the prior exam, the mass involving the

cervix has decreased in size considerably. The interface between

the mass and the uterus is somewhat difficult to establish and

consequently the precise decrease in size of the mass is not

certain. However, it does appear to be significant. There is only

a small amount of residual density still present in the cervix.

There are small metallic densities in this area which have been

inserted since the prior exam.



The left iliac chain adenopathy noted previously has also

diminished. On the prior exam, the nodes in the left iliac region

measured 2.4 x 4.7 cm. On this study, those nodes now measure 1.0

x 2.2 cm. The 3.1 x 3.9 cm low-density mass in the left adnexa

seen previously is not well visualized on this study. The 2.8 x

2.9 cm periaortic lymph node on the left seen on the prior exam

has also decreased in size and now measures 1.0 x 1.2 cm.



Furthermore, there has been insertion of a stent into the left

ureter. The stent appears to be in good position and the left

ureter and left renal pelvis are less distended than noted on the

prior exam.



The liver, spleen, pancreas, adrenals, kidneys, gallbladder,

aorta and inferior vena cava show no sign of an acute

abnormality. The stomach is partially filled with oral contrast

and consequently difficult to assess.



The images through the thorax show that the heart size is within

normal limits. There are no coronary artery calcifications

identified. The aorta is not abnormally dilated and there is no

sign of dissection. The pulmonary arteries were not well

opacified and consequently the evaluation for pulmonary embolus

is limited. There is no obvious defect within the pulmonary

arteries to indicate a pulmonary embolus.



There is no mediastinal or hilar adenopathy. The thyroid gland is

generally unremarkable.



The lungs are clear. There is no sign of failure, pneumonia or

pleural effusion to indicate an acute abnormality. There is no

parenchymal lung mass visualized either.



There is no obvious breast mass. The bone windows show no sign of

a fracture or of a destructive lesion. There is symmetrical

fairly severe sclerosis of both sacroiliac joints.



IMPRESSION:

1. The cervical mass seen on the previous exam has decreased in

size significantly. The neoplastic adenopathy noted on the prior

exam has also diminished.

2. There has been interval insertion of a ureteral stent on the

left. The stent appears to be in good position and the left

collecting system does appear decompressed when compared to the

prior exam.

3. There is no acute abnormality of the chest, abdomen or pelvis

identified. There is no other sign of neoplastic disease either.



Dictated by: 



  Dictated on workstation # [id number]

## 2018-02-12 LAB
BASOPHILS # BLD AUTO: 0 10^3/UL (ref 0–0.1)
BASOPHILS NFR BLD AUTO: 0 % (ref 0–10)
BUN/CREAT SERPL: 24
CALCIUM SERPL-MCNC: 9.3 MG/DL (ref 8.5–10.1)
CHLORIDE SERPL-SCNC: 100 MMOL/L (ref 98–107)
CO2 SERPL-SCNC: 25 MMOL/L (ref 21–32)
CREAT SERPL-MCNC: 0.75 MG/DL (ref 0.6–1.3)
EOSINOPHIL # BLD AUTO: 0.5 10^3/UL (ref 0–0.3)
EOSINOPHIL NFR BLD AUTO: 8 % (ref 0–10)
ERYTHROCYTE [DISTWIDTH] IN BLOOD BY AUTOMATED COUNT: 14 % (ref 10–14.5)
GFR SERPLBLD BASED ON 1.73 SQ M-ARVRAT: > 60 ML/MIN
GLUCOSE SERPL-MCNC: 265 MG/DL (ref 70–105)
HCT VFR BLD CALC: 35 % (ref 35–52)
HGB BLD-MCNC: 12.3 G/DL (ref 11.5–16)
LYMPHOCYTES # BLD AUTO: 0.6 X 10^3 (ref 1–4)
LYMPHOCYTES NFR BLD AUTO: 11 % (ref 12–44)
MAGNESIUM SERPL-MCNC: 1.5 MG/DL (ref 1.8–2.4)
MANUAL DIFFERENTIAL PERFORMED BLD QL: NO
MCH RBC QN AUTO: 31 PG (ref 25–34)
MCHC RBC AUTO-ENTMCNC: 36 G/DL (ref 32–36)
MCV RBC AUTO: 87 FL (ref 80–99)
MONOCYTES # BLD AUTO: 0.5 X 10^3 (ref 0–1)
MONOCYTES NFR BLD AUTO: 8 % (ref 0–12)
NEUTROPHILS # BLD AUTO: 4.1 X 10^3 (ref 1.8–7.8)
NEUTROPHILS NFR BLD AUTO: 73 % (ref 42–75)
PLATELET # BLD: 173 10^3/UL (ref 130–400)
PMV BLD AUTO: 8.8 FL (ref 7.4–10.4)
POTASSIUM SERPL-SCNC: 4.3 MMOL/L (ref 3.6–5)
RBC # BLD AUTO: 3.97 10^6/UL (ref 4.35–5.85)
SODIUM SERPL-SCNC: 134 MMOL/L (ref 135–145)
WBC # BLD AUTO: 5.7 10^3/UL (ref 4.3–11)

## 2018-03-16 ENCOUNTER — HOSPITAL ENCOUNTER (OUTPATIENT)
Dept: HOSPITAL 75 - ONC | Age: 43
LOS: 24 days | Discharge: HOME | End: 2018-04-09
Attending: INTERNAL MEDICINE
Payer: MEDICAID

## 2018-03-16 DIAGNOSIS — K86.9: ICD-10-CM

## 2018-03-16 DIAGNOSIS — E11.9: ICD-10-CM

## 2018-03-16 DIAGNOSIS — R59.0: ICD-10-CM

## 2018-03-16 DIAGNOSIS — E66.01: ICD-10-CM

## 2018-03-16 DIAGNOSIS — Z79.899: ICD-10-CM

## 2018-03-16 DIAGNOSIS — D50.0: ICD-10-CM

## 2018-03-16 DIAGNOSIS — F17.210: ICD-10-CM

## 2018-03-16 DIAGNOSIS — Z45.2: ICD-10-CM

## 2018-03-16 DIAGNOSIS — F32.9: ICD-10-CM

## 2018-03-16 DIAGNOSIS — C53.9: ICD-10-CM

## 2018-03-16 DIAGNOSIS — C77.5: ICD-10-CM

## 2018-03-16 DIAGNOSIS — Z51.0: Primary | ICD-10-CM

## 2018-03-16 DIAGNOSIS — K43.9: ICD-10-CM

## 2018-03-16 PROCEDURE — 99214 OFFICE O/P EST MOD 30 MIN: CPT

## 2018-03-16 PROCEDURE — 82728 ASSAY OF FERRITIN: CPT

## 2018-03-16 PROCEDURE — 80048 BASIC METABOLIC PNL TOTAL CA: CPT

## 2018-03-16 PROCEDURE — 99213 OFFICE O/P EST LOW 20 MIN: CPT

## 2018-03-16 PROCEDURE — 80053 COMPREHEN METABOLIC PANEL: CPT

## 2018-03-16 PROCEDURE — 77336 RADIATION PHYSICS CONSULT: CPT

## 2018-03-16 PROCEDURE — 85025 COMPLETE CBC W/AUTO DIFF WBC: CPT

## 2018-03-16 PROCEDURE — 83540 ASSAY OF IRON: CPT

## 2018-03-16 PROCEDURE — 86304 IMMUNOASSAY TUMOR CA 125: CPT

## 2018-03-16 PROCEDURE — 36591 DRAW BLOOD OFF VENOUS DEVICE: CPT

## 2018-03-16 PROCEDURE — 36415 COLL VENOUS BLD VENIPUNCTURE: CPT

## 2018-03-16 PROCEDURE — 83735 ASSAY OF MAGNESIUM: CPT

## 2018-03-16 PROCEDURE — 96523 IRRIG DRUG DELIVERY DEVICE: CPT

## 2018-05-03 LAB
ALBUMIN SERPL-MCNC: 3.8 GM/DL (ref 3.2–4.5)
ALP SERPL-CCNC: 77 U/L (ref 40–136)
ALT SERPL-CCNC: 17 U/L (ref 0–55)
BASOPHILS # BLD AUTO: 0 10^3/UL (ref 0–0.1)
BASOPHILS NFR BLD AUTO: 0 % (ref 0–10)
BILIRUB SERPL-MCNC: 0.5 MG/DL (ref 0.1–1)
BUN/CREAT SERPL: 15
CALCIUM SERPL-MCNC: 9.4 MG/DL (ref 8.5–10.1)
CHLORIDE SERPL-SCNC: 101 MMOL/L (ref 98–107)
CO2 SERPL-SCNC: 25 MMOL/L (ref 21–32)
CREAT SERPL-MCNC: 0.8 MG/DL (ref 0.6–1.3)
EOSINOPHIL # BLD AUTO: 0.1 10^3/UL (ref 0–0.3)
EOSINOPHIL NFR BLD AUTO: 1 % (ref 0–10)
ERYTHROCYTE [DISTWIDTH] IN BLOOD BY AUTOMATED COUNT: 13.7 % (ref 10–14.5)
GFR SERPLBLD BASED ON 1.73 SQ M-ARVRAT: > 60 ML/MIN
GLUCOSE SERPL-MCNC: 308 MG/DL (ref 70–105)
HCT VFR BLD CALC: 37 % (ref 35–52)
HGB BLD-MCNC: 12.9 G/DL (ref 11.5–16)
LYMPHOCYTES # BLD AUTO: 0.6 X 10^3 (ref 1–4)
LYMPHOCYTES NFR BLD AUTO: 8 % (ref 12–44)
MANUAL DIFFERENTIAL PERFORMED BLD QL: NO
MCH RBC QN AUTO: 28 PG (ref 25–34)
MCHC RBC AUTO-ENTMCNC: 35 G/DL (ref 32–36)
MCV RBC AUTO: 81 FL (ref 80–99)
MONOCYTES # BLD AUTO: 0.5 X 10^3 (ref 0–1)
MONOCYTES NFR BLD AUTO: 7 % (ref 0–12)
NEUTROPHILS # BLD AUTO: 6 X 10^3 (ref 1.8–7.8)
NEUTROPHILS NFR BLD AUTO: 84 % (ref 42–75)
PLATELET # BLD: 211 10^3/UL (ref 130–400)
PMV BLD AUTO: 9.1 FL (ref 7.4–10.4)
POTASSIUM SERPL-SCNC: 4.1 MMOL/L (ref 3.6–5)
PROT SERPL-MCNC: 7.1 GM/DL (ref 6.4–8.2)
RBC # BLD AUTO: 4.56 10^6/UL (ref 4.35–5.85)
SODIUM SERPL-SCNC: 135 MMOL/L (ref 135–145)
WBC # BLD AUTO: 7.1 10^3/UL (ref 4.3–11)

## 2018-05-04 ENCOUNTER — HOSPITAL ENCOUNTER (OUTPATIENT)
Dept: HOSPITAL 75 - RAD | Age: 43
End: 2018-05-04
Attending: INTERNAL MEDICINE
Payer: MEDICAID

## 2018-05-04 DIAGNOSIS — T83.122A: ICD-10-CM

## 2018-05-04 DIAGNOSIS — R16.2: ICD-10-CM

## 2018-05-04 DIAGNOSIS — C53.9: Primary | ICD-10-CM

## 2018-05-04 DIAGNOSIS — R59.0: ICD-10-CM

## 2018-05-04 DIAGNOSIS — Z96.0: ICD-10-CM

## 2018-05-04 DIAGNOSIS — R19.03: ICD-10-CM

## 2018-05-04 PROCEDURE — 71260 CT THORAX DX C+: CPT

## 2018-05-04 PROCEDURE — 74177 CT ABD & PELVIS W/CONTRAST: CPT

## 2018-05-04 NOTE — DIAGNOSTIC IMAGING REPORT
PROCEDURE: CT chest, abdomen, and pelvis with contrast.



TECHNIQUE: Multiple contiguous axial images were obtained through

the chest, abdomen, and pelvis after the administration of

intravenous contrast.



INDICATION: Cervical carcinoma, follow-up.



COMPARISON: Comparison is made with prior CT from 02/07/2018.



FINDINGS:



CT chest:



Right chest wall port has the tip within the superior vena cava.

No axillary lymphadenopathy is seen. A small node located between

the left common carotid artery and left subclavian demonstrates

short axis measurement of 8 mm compared with 9 mm on prior exam.

No other enlarged mediastinal or hilar lymph nodes are seen. No

pericardial or pleural fluid is detected. No pulmonary

infiltrates, nodules, or masses are seen.



IMPRESSION: Stable CT of the chest when compared with prior exam

from 02/07/2018. No new or enlarging thoracic lymphadenopathy or

evidence of pulmonary metastatic disease is identified.



CT abdomen and pelvis:



The liver is enlarged at approximately 24 cm, similar to prior

exam. No discrete liver mass is identified. The gallbladder is

unremarkable. The pancreas is unremarkable. Spleen is enlarged at

approximately 17 cm, stable. No adrenal mass is identified. The

right kidney is unremarkable. Patient's left-sided double-J

nephroureteral stent appears to be malpositioned. This is now

more inferiorly displaced and located in the mid left ureter and

extends into the bladder. There appears to be some looping of the

stent in the mid ureter. There appears to be periureteral

inflammatory stranding, similar to prior exam. A left periaortic

lymph node has increased in size and is located just cephalad to

the aortic bifurcation. This measures 2.5 x 1.9 cm compared with

1.2 x 1.0 cm. The previously noted enlarged left iliac node

appears smaller at 1.3 x 1.0 cm compared with 2.2 x 1.0 cm. Right

iliac region is unremarkable. No inguinal lymphadenopathy is

seen. Appearance of the uterus and cervix appears stable. There

is no ascites. There has been development of abnormal soft tissue

identified in the inferior abdomen anteriorly just deep to the

abdominal wall. Lobulated soft tissue mass-like density is seen,

in aggregate measuring approximately 14.3 cm transverse x 5.8 cm

AP. This may have been visible on prior exam but was barely

visible and has significantly increased. This may represent

peritoneal implants.



IMPRESSION:

1. Enlarging left para-aortic lymph node when compared with prior

study from 02/07/2018. In addition, there has been development of

abnormal soft tissue mass-like density in the anterior midline in

right lower abdomen, suspicious for peritoneal implants. The left

iliac node is decreased in size.

2. Malpositioned left ureteral double-J stent, which is now

displaced inferiorly and coiled in the mid ureter. The distal

portion of the stent remains within the urinary bladder.

3. Stable hepatosplenomegaly.



Dictated by: 



  Dictated on workstation # [id number]

## 2018-05-10 ENCOUNTER — HOSPITAL ENCOUNTER (OUTPATIENT)
Dept: HOSPITAL 75 - PREOP | Age: 43
End: 2018-05-10
Attending: SURGERY
Payer: MEDICAID

## 2018-05-10 VITALS — HEIGHT: 67 IN | BODY MASS INDEX: 44.42 KG/M2 | WEIGHT: 283 LBS

## 2018-05-10 DIAGNOSIS — Z01.818: Primary | ICD-10-CM

## 2018-05-14 ENCOUNTER — HOSPITAL ENCOUNTER (OUTPATIENT)
Dept: HOSPITAL 75 - SDC | Age: 43
Discharge: HOME | End: 2018-05-14
Attending: SURGERY
Payer: MEDICAID

## 2018-05-14 VITALS — SYSTOLIC BLOOD PRESSURE: 103 MMHG | DIASTOLIC BLOOD PRESSURE: 54 MMHG

## 2018-05-14 VITALS — DIASTOLIC BLOOD PRESSURE: 54 MMHG | SYSTOLIC BLOOD PRESSURE: 103 MMHG

## 2018-05-14 VITALS — DIASTOLIC BLOOD PRESSURE: 63 MMHG | SYSTOLIC BLOOD PRESSURE: 107 MMHG

## 2018-05-14 VITALS — SYSTOLIC BLOOD PRESSURE: 112 MMHG | DIASTOLIC BLOOD PRESSURE: 76 MMHG

## 2018-05-14 VITALS — WEIGHT: 283 LBS | BODY MASS INDEX: 44.42 KG/M2 | HEIGHT: 67 IN

## 2018-05-14 DIAGNOSIS — E11.9: ICD-10-CM

## 2018-05-14 DIAGNOSIS — Z79.4: ICD-10-CM

## 2018-05-14 DIAGNOSIS — Z92.21: ICD-10-CM

## 2018-05-14 DIAGNOSIS — C53.9: ICD-10-CM

## 2018-05-14 DIAGNOSIS — E66.01: ICD-10-CM

## 2018-05-14 DIAGNOSIS — C79.89: Primary | ICD-10-CM

## 2018-05-14 DIAGNOSIS — F17.210: ICD-10-CM

## 2018-05-14 DIAGNOSIS — Z92.3: ICD-10-CM

## 2018-05-14 DIAGNOSIS — G47.33: ICD-10-CM

## 2018-05-14 PROCEDURE — 82962 GLUCOSE BLOOD TEST: CPT

## 2018-05-14 PROCEDURE — 88341 IMHCHEM/IMCYTCHM EA ADD ANTB: CPT

## 2018-05-14 PROCEDURE — 84703 CHORIONIC GONADOTROPIN ASSAY: CPT

## 2018-05-14 PROCEDURE — 88342 IMHCHEM/IMCYTCHM 1ST ANTB: CPT

## 2018-05-14 PROCEDURE — 88305 TISSUE EXAM BY PATHOLOGIST: CPT

## 2018-05-14 PROCEDURE — 88331 PATH CONSLTJ SURG 1 BLK 1SPC: CPT

## 2018-05-14 PROCEDURE — 87081 CULTURE SCREEN ONLY: CPT

## 2018-05-14 NOTE — ANESTHESIA-GENERAL POST-OP
General


Patient Condition


Mental Status/LOC:  Same as Preop


Cardiovascular:  Satisfactory


Nausea/Vomiting:  Absent


Respiratory:  Satisfactory


Pain:  Controlled


Complications:  Absent





Post Op Complications


Complications


None





Follow Up Care/Instructions


Patient Instructions


None needed.





Anesthesia/Patient Condition


Patient Condition


Patient is doing well, no complaints, stable vital signs, no apparent adverse 

anesthesia problems.   


No complications reported per nursing.


D/C home per [facility]:  No











RED LANDIN CRNA May 14, 2018 12:52

## 2018-05-14 NOTE — XMS REPORT
Continuity of Care Document

 Created on: 2018



Kaylyn Chavira

External Reference #: [id number]

: [id number]

Sex: Female



Demographics







 Address  [address] [place] [address]  [address]

 

 Home Phone  [phone]

 

 Preferred Language  Unknown

 

 Marital Status  Unknown

 

 Synagogue Affiliation  Unknown

 

 Race  Unknown

 

 Ethnic Group  Unknown





Author







 Author  [facility]

 

 Address  Unknown

 

 Phone  Unavailable



              



Allergies

      





 Active            Description            Code            Type            
Severity            Reaction            Onset            Reported/Identified   
         Relationship to Patient            Clinical Status        

 

 Yes            No Known Drug Allergies            [id number]                    
     N/A            N/A                                                        
    

 

 Yes            No Known Drug Allergies            [id number]            Drug 
Allergy            Unknown            N/A                         10/05/2017   
                               



                    



Medications

      



There is no data.                  



Problems

      





 Date Dx Coded            Attending            Type            Code            
Diagnosis            Diagnosed By        

 

 2017            FERDI FOFANA MD, Ot            C53.9       
     MALIGNANT NEOPLASM OF CERVIX UTERI, UNSP                     

 

 2017            FREDI FOFANA MD, Ot            D50.0       
     IRON DEFICIENCY ANEMIA SECONDARY TO BLOO                     

 

 2017            FREDI FOFANA MD, Ot            E11.9       
     TYPE 2 DIABETES MELLITUS WITHOUT COMPLIC                     

 

 2017            FREDI FOFANA MD, Ot            E66.01      
      MORBID (SEVERE) OBESITY DUE TO EXCESS CA                     

 

 2017            FREDI FOFANA MD, Ot            F17.210     
       NICOTINE DEPENDENCE, CIGARETTES, UNCOMPL                     

 

 2017            FREDI FOFANA MD, Ot            F32.9       
     MAJOR DEPRESSIVE DISORDER, SINGLE EPISOD                     

 

 2017            FREDI FOFANA MD, Ot            K86.9       
     DISEASE OF PANCREAS, UNSPECIFIED                     

 

 2017            FREDI FOFANA MD, Ot            R59.0       
     LOCALIZED ENLARGED LYMPH NODES                     

 

 2017            FREDI FOFANA MD, Ot            Z68.42      
      BODY MASS INDEX (BMI) 45.0-49.9, ADULT                     

 

 2017            FREDI FOFANA MD, Ot            Z79.899     
       OTHER LONG TERM (CURRENT) DRUG THERAPY                     

 

 2017            FREDI FOFANA MD, Ot            C53.9       
     MALIGNANT NEOPLASM OF CERVIX UTERI, UNSP                     

 

 2017            FREDI FOFANA MD, Ot            D50.0       
     IRON DEFICIENCY ANEMIA SECONDARY TO BLOO                     

 

 2017            FREDI FOFANA MD, Ot            E11.9       
     TYPE 2 DIABETES MELLITUS WITHOUT COMPLIC                     

 

 2017            FREDI FOFANA MD, Ot            E66.01      
      MORBID (SEVERE) OBESITY DUE TO EXCESS CA                     

 

 2017            FREDI FOFANA MD            Ot            F17.210     
       NICOTINE DEPENDENCE, CIGARETTES, UNCOMPL                     

 

 2017            FREDI FOFANA MD, Ot            F32.9       
     MAJOR DEPRESSIVE DISORDER, SINGLE EPISOD                     

 

 2017            FREDI FOFANA MD, Ot            K86.9       
     DISEASE OF PANCREAS, UNSPECIFIED                     

 

 2017            FREDI FOFANA MD            Ot            R59.0       
     LOCALIZED ENLARGED LYMPH NODES                     

 

 2017            FREDI FOFANA MD, Ot            Z68.42      
      BODY MASS INDEX (BMI) 45.0-49.9, ADULT                     

 

 2017            FREDI FOFANA MD, Ot            Z79.899     
       OTHER LONG TERM (CURRENT) DRUG THERAPY                     

 

 10/05/2017            EDWIN RUSSELL DO            Ot            C53.9       
     MALIGNANT NEOPLASM OF CERVIX UTERI, UNSP                     

 

 10/05/2017            EDWIN RUSSELL DO            Ot            Z01.818     
       ENCOUNTER FOR OTHER PREPROCEDURAL EXAMIN                     

 

 10/05/2017            FREDI FOFANA MD, Ot            C53.9       
     MALIGNANT NEOPLASM OF CERVIX UTERI, UNSP                     

 

 10/05/2017            FREDI FOFANA MD, Ot            D50.0       
     IRON DEFICIENCY ANEMIA SECONDARY TO BLOO                     

 

 10/05/2017            FREDI FOFANA MD            Ot            E11.9       
     TYPE 2 DIABETES MELLITUS WITHOUT COMPLIC                     

 

 10/05/2017            FREDI FOFANA MD, Ot            E66.01      
      MORBID (SEVERE) OBESITY DUE TO EXCESS CA                     

 

 10/05/2017            FREDI FOFANA MD, Ot            F17.210     
       NICOTINE DEPENDENCE, CIGARETTES, UNCOMPL                     

 

 10/05/2017            FREDI FOFANA MD, Ot            F32.9       
     MAJOR DEPRESSIVE DISORDER, SINGLE EPISOD                     

 

 10/05/2017            FREDI FOFANA MD, Ot            K86.9       
     DISEASE OF PANCREAS, UNSPECIFIED                     

 

 10/05/2017            FREDI FOFANA MD, Ot            R59.0       
     LOCALIZED ENLARGED LYMPH NODES                     

 

 10/05/2017            FREDI FOFANA MD, Ot            Z68.42      
      BODY MASS INDEX (BMI) 45.0-49.9, ADULT                     

 

 10/05/2017            FREDI FOFANA MD, Ot            Z79.899     
       OTHER LONG TERM (CURRENT) DRUG THERAPY                     

 

 10/06/2017            EDWIN RUSSELL DO            Ot            C53.9       
     MALIGNANT NEOPLASM OF CERVIX UTERI, UNSP                     

 

 10/06/2017            EDWIN RUSSELL DO            Ot            Z01.818     
       ENCOUNTER FOR OTHER PREPROCEDURAL EXAMIN                     

 

 10/06/2017            RUSSELL DO, EDWIN D            Ot            C53.9       
     MALIGNANT NEOPLASM OF CERVIX UTERI, UNSP                     

 

 10/06/2017            RUSSELL DO, EDWIN D            Ot            D23.71      
      OTH BENIGN NEOPLASM SKIN/ RIGHT LOWER LI                     

 

 10/06/2017            RUSSELL DO, EDWIN D            Ot            D64.9       
     ANEMIA, UNSPECIFIED                     

 

 10/06/2017            RUSSELL DO, EDWIN D            Ot            E11.9       
     TYPE 2 DIABETES MELLITUS WITHOUT COMPLIC                     

 

 10/06/2017            RUSSELL DO, EDWIN D            Ot            E66.01      
      MORBID (SEVERE) OBESITY DUE TO EXCESS CA                     

 

 10/06/2017            RUSSELL DO, EDWIN D            Ot            F17.210     
       NICOTINE DEPENDENCE, CIGARETTES, UNCOMPL                     

 

 10/06/2017            RUSSELL DO, EDWIN D            Ot            G47.33      
      OBSTRUCTIVE SLEEP APNEA (ADULT) (PEDIATR                     

 

 10/06/2017            RUSSELL DO, EDWIN D            Ot            Z68.41      
      BODY MASS INDEX (BMI) 40.0-44.9, ADULT                     

 

 10/06/2017            RUSSELL DO, EDWIN D            Ot            Z79.899     
       OTHER LONG TERM (CURRENT) DRUG THERAPY                     

 

 10/10/2017            RUSSELL DO, EDWIN D            Ot            C53.9       
     MALIGNANT NEOPLASM OF CERVIX UTERI, UNSP                     

 

 10/10/2017            RUSSELL DO, EDWIN D            Ot            D23.71      
      OTH BENIGN NEOPLASM SKIN/ RIGHT LOWER LI                     

 

 10/10/2017            RUSSELL DO, EDWIN D            Ot            D64.9       
     ANEMIA, UNSPECIFIED                     

 

 10/10/2017            RUSSELL DO, EDWIN D            Ot            E11.9       
     TYPE 2 DIABETES MELLITUS WITHOUT COMPLIC                     

 

 10/10/2017            RUSSELL DO, EDWIN D            Ot            E66.01      
      MORBID (SEVERE) OBESITY DUE TO EXCESS CA                     

 

 10/10/2017            RUSSELL DO, EDWIN D            Ot            F17.210     
       NICOTINE DEPENDENCE, CIGARETTES, UNCOMPL                     

 

 10/10/2017            RUSSELL DO, EDWIN D            Ot            G47.33      
      OBSTRUCTIVE SLEEP APNEA (ADULT) (PEDIATR                     

 

 10/10/2017            RUSSELL DO, EDWIN D            Ot            Z68.41      
      BODY MASS INDEX (BMI) 40.0-44.9, ADULT                     

 

 10/10/2017            RUSSELL DO, EDWIN D            Ot            Z79.899     
       OTHER LONG TERM (CURRENT) DRUG THERAPY                     

 

 10/11/2017            FREDI FOFANA MD            Ot            C53.9       
     MALIGNANT NEOPLASM OF CERVIX UTERI, UNSP                     

 

 10/11/2017            XUFREDI HALL MD, Ot            D50.0       
     IRON DEFICIENCY ANEMIA SECONDARY TO BLOO                     

 

 10/11/2017            FREDI FOFANA MD            Ot            E11.9       
     TYPE 2 DIABETES MELLITUS WITHOUT COMPLIC                     

 

 10/11/2017            FREDI FOFANA MD, Ot            E66.01      
      MORBID (SEVERE) OBESITY DUE TO EXCESS CA                     

 

 10/11/2017            FREDI FOFANA MD, Ot            F17.210     
       NICOTINE DEPENDENCE, CIGARETTES, UNCOMPL                     

 

 10/11/2017            FREDI FOFANA MD, Ot            F32.9       
     MAJOR DEPRESSIVE DISORDER, SINGLE EPISOD                     

 

 10/11/2017            FREDI FOFANA MD, Ot            K86.9       
     DISEASE OF PANCREAS, UNSPECIFIED                     

 

 10/11/2017            FREDI FOFANA MD, Ot            R59.0       
     LOCALIZED ENLARGED LYMPH NODES                     

 

 10/11/2017            FREDI FOFANA MD, Ot            Z68.42      
      BODY MASS INDEX (BMI) 45.0-49.9, ADULT                     

 

 10/11/2017            FREDI FOFANA MD, Ot            Z79.899     
       OTHER LONG TERM (CURRENT) DRUG THERAPY                     

 

 10/12/2017            FREDI FOFANA MD, Ot            C53.9       
     MALIGNANT NEOPLASM OF CERVIX UTERI, UNSP                     

 

 10/12/2017            FREDI FOFANA MD, Ot            D50.0       
     IRON DEFICIENCY ANEMIA SECONDARY TO BLOO                     

 

 10/12/2017            FREDI FOFANA MD            Ot            E11.9       
     TYPE 2 DIABETES MELLITUS WITHOUT COMPLIC                     

 

 10/12/2017            FREDI FOFANA MD, Ot            E66.01      
      MORBID (SEVERE) OBESITY DUE TO EXCESS CA                     

 

 10/12/2017            FREDI FOFANA MD, Ot            F17.210     
       NICOTINE DEPENDENCE, CIGARETTES, UNCOMPL                     

 

 10/12/2017            FREDI FOFANA MD, Ot            F32.9       
     MAJOR DEPRESSIVE DISORDER, SINGLE EPISOD                     

 

 10/12/2017            FREDI FOFANA MD, Ot            K86.9       
     DISEASE OF PANCREAS, UNSPECIFIED                     

 

 10/12/2017            FREDI FOFANA MD, Ot            R59.0       
     LOCALIZED ENLARGED LYMPH NODES                     

 

 10/12/2017            FREDI FOFANA MD, Ot            Z68.42      
      BODY MASS INDEX (BMI) 45.0-49.9, ADULT                     

 

 10/12/2017            FREDI FOFANA MD, Ot            Z79.899     
       OTHER LONG TERM (CURRENT) DRUG THERAPY                     

 

 10/30/2017            FREDI FOFANA MD, Ot            C53.9       
     MALIGNANT NEOPLASM OF CERVIX UTERI, UNSP                     

 

 10/30/2017            FREDI FOFANA MD, Ot            C77.5       
     SECONDARY AND UNSP MALIGNANT NEOPLASM OF                     

 

 10/30/2017            FREDI FOFANA MD, Ot            K43.9       
     VENTRAL HERNIA WITHOUT OBSTRUCTION OR GA                     

 

 10/30/2017            FREDI FOFANA MD, Ot            C53.9       
     MALIGNANT NEOPLASM OF CERVIX UTERI, UNSP                     

 

 10/30/2017            FREDI FOFANA MD, Ot            D50.0       
     IRON DEFICIENCY ANEMIA SECONDARY TO BLOO                     

 

 10/30/2017            FREDI FOFANA MD, Ot            E11.9       
     TYPE 2 DIABETES MELLITUS WITHOUT COMPLIC                     

 

 10/30/2017            FREDI FOFANA MD, Ot            E66.01      
      MORBID (SEVERE) OBESITY DUE TO EXCESS CA                     

 

 10/30/2017            FREDI FOFANA MD, Ot            F17.210     
       NICOTINE DEPENDENCE, CIGARETTES, UNCOMPL                     

 

 10/30/2017            FREDI FOFANA MD, Ot            F32.9       
     MAJOR DEPRESSIVE DISORDER, SINGLE EPISOD                     

 

 10/30/2017            FREDI FOFANA MD, Ot            K86.9       
     DISEASE OF PANCREAS, UNSPECIFIED                     

 

 10/30/2017            FREDI FOFANA MD, Ot            R59.0       
     LOCALIZED ENLARGED LYMPH NODES                     

 

 10/30/2017            FREDI FOFANA MD, Ot            Z68.42      
      BODY MASS INDEX (BMI) 45.0-49.9, ADULT                     

 

 10/30/2017            FREDI FOFANA MD, Ot            Z79.899     
       OTHER LONG TERM (CURRENT) DRUG THERAPY                     

 

 10/30/2017            FREDI FOFANA MD, Ot            C53.9       
     MALIGNANT NEOPLASM OF CERVIX UTERI, UNSP                     

 

 10/30/2017            FREDI FOFANA MD, Ot            D50.0       
     IRON DEFICIENCY ANEMIA SECONDARY TO BLOO                     

 

 10/30/2017            FREDI FOFANA MD, Ot            E11.9       
     TYPE 2 DIABETES MELLITUS WITHOUT COMPLIC                     

 

 10/30/2017            FREDI FOFANA MD, Ot            E66.01      
      MORBID (SEVERE) OBESITY DUE TO EXCESS CA                     

 

 10/30/2017            FREDI FOFANA MD, Ot            F17.210     
       NICOTINE DEPENDENCE, CIGARETTES, UNCOMPL                     

 

 10/30/2017            FREDI FOFANA MD, Ot            F32.9       
     MAJOR DEPRESSIVE DISORDER, SINGLE EPISOD                     

 

 10/30/2017            FREDI FOFANA MD, Ot            K86.9       
     DISEASE OF PANCREAS, UNSPECIFIED                     

 

 10/30/2017            FREDI FOFANA MD, Ot            R59.0       
     LOCALIZED ENLARGED LYMPH NODES                     

 

 10/30/2017            FREDI FOFANA MD, Ot            Z68.42      
      BODY MASS INDEX (BMI) 45.0-49.9, ADULT                     

 

 10/30/2017            FREDI FOFANA MD, Ot            Z79.899     
       OTHER LONG TERM (CURRENT) DRUG THERAPY                     

 

 10/30/2017            FREDI FOFANA MD, Ot            C53.9       
     MALIGNANT NEOPLASM OF CERVIX UTERI, UNSP                     

 

 10/30/2017            FREDI FOFANA MD, Ot            D50.0       
     IRON DEFICIENCY ANEMIA SECONDARY TO BLOO                     

 

 10/30/2017            FREDI FOFANA MD, Ot            E11.9       
     TYPE 2 DIABETES MELLITUS WITHOUT COMPLIC                     

 

 10/30/2017            FREDI FOFANA MD, Ot            E66.01      
      MORBID (SEVERE) OBESITY DUE TO EXCESS CA                     

 

 10/30/2017            FREDI FOFANA MD, Ot            F17.210     
       NICOTINE DEPENDENCE, CIGARETTES, UNCOMPL                     

 

 10/30/2017            FREDI FOFANA MD, Ot            F32.9       
     MAJOR DEPRESSIVE DISORDER, SINGLE EPISOD                     

 

 10/30/2017            FREDI FOFANA MD, Ot            K86.9       
     DISEASE OF PANCREAS, UNSPECIFIED                     

 

 10/30/2017            FREDI FOFANA MD, Ot            R59.0       
     LOCALIZED ENLARGED LYMPH NODES                     

 

 10/30/2017            FREDI FOFANA MD, Ot            Z68.42      
      BODY MASS INDEX (BMI) 45.0-49.9, ADULT                     

 

 10/30/2017            FREDI FOFANA MD, Ot            Z79.899     
       OTHER LONG TERM (CURRENT) DRUG THERAPY                     

 

 2017            FREDI FOFANA MD, Ot            C53.9       
     MALIGNANT NEOPLASM OF CERVIX UTERI, UNSP                     

 

 2017            FREDI FOFANA MD, Ot            D50.0       
     IRON DEFICIENCY ANEMIA SECONDARY TO BLOO                     

 

 2017            FREDI FOFANA MD, Ot            E11.9       
     TYPE 2 DIABETES MELLITUS WITHOUT COMPLIC                     

 

 2017            FREDI FOFANA MD, Ot            E66.01      
      MORBID (SEVERE) OBESITY DUE TO EXCESS CA                     

 

 2017            FREDI FOFANA MD, Ot            F17.210     
       NICOTINE DEPENDENCE, CIGARETTES, UNCOMPL                     

 

 2017            FREDI FOFANA MD, Ot            F32.9       
     MAJOR DEPRESSIVE DISORDER, SINGLE EPISOD                     

 

 2017            FREDI FOFANA MD, Ot            K86.9       
     DISEASE OF PANCREAS, UNSPECIFIED                     

 

 2017            FREDI FOFANA MD            Ot            R59.0       
     LOCALIZED ENLARGED LYMPH NODES                     

 

 2017            FREDI FOFANA MD, Ot            Z68.42      
      BODY MASS INDEX (BMI) 45.0-49.9, ADULT                     

 

 2017            FREDI FOFANA MD, Ot            Z79.899     
       OTHER LONG TERM (CURRENT) DRUG THERAPY                     

 

 2017            FREDI FOFANA MD, Ot            C53.9       
     MALIGNANT NEOPLASM OF CERVIX UTERI, UNSP                     

 

 2017            FREDI FOFANA MD, Ot            C77.5       
     SECONDARY AND UNSP MALIGNANT NEOPLASM OF                     

 

 2017            FREDI FOFANA MD, Ot            K43.9       
     VENTRAL HERNIA WITHOUT OBSTRUCTION OR GA                     

 

 2017            FREDI FOFANA MD, Ot            C53.9       
     MALIGNANT NEOPLASM OF CERVIX UTERI, UNSP                     

 

 2017            FREDI FOFANA MD, Ot            D50.0       
     IRON DEFICIENCY ANEMIA SECONDARY TO BLOO                     

 

 2017            FREDI FOFANA MD, Ot            E11.9       
     TYPE 2 DIABETES MELLITUS WITHOUT COMPLIC                     

 

 2017            FREDI FOFANA MD, Ot            E66.01      
      MORBID (SEVERE) OBESITY DUE TO EXCESS CA                     

 

 2017            FREDI FOFANA MD, Ot            F17.210     
       NICOTINE DEPENDENCE, CIGARETTES, UNCOMPL                     

 

 2017            FREDI FOFANA MD, Ot            F32.9       
     MAJOR DEPRESSIVE DISORDER, SINGLE EPISOD                     

 

 2017            FREDI FOFANA MD, Ot            K86.9       
     DISEASE OF PANCREAS, UNSPECIFIED                     

 

 2017            FREDI FOFANA MD, Ot            R59.0       
     LOCALIZED ENLARGED LYMPH NODES                     

 

 2017            FREDI FOFANA MD, Ot            Z68.42      
      BODY MASS INDEX (BMI) 45.0-49.9, ADULT                     

 

 2017            FREDI FOFANA MD, Ot            Z79.899     
       OTHER LONG TERM (CURRENT) DRUG THERAPY                     

 

 2017            FREDI FOFANA MD, Ot            C53.9       
     MALIGNANT NEOPLASM OF CERVIX UTERI, UNSP                     

 

 2017            FREDI FOFANA MD, Ot            D50.0       
     IRON DEFICIENCY ANEMIA SECONDARY TO BLOO                     

 

 2017            FREDI FOFANA MD, Ot            E11.9       
     TYPE 2 DIABETES MELLITUS WITHOUT COMPLIC                     

 

 2017            FREDI FOFANA MD, Ot            E66.01      
      MORBID (SEVERE) OBESITY DUE TO EXCESS CA                     

 

 2017            FREDI FOFANA MD, Ot            F17.210     
       NICOTINE DEPENDENCE, CIGARETTES, UNCOMPL                     

 

 2017            FREDI FOFANA MD, Ot            F32.9       
     MAJOR DEPRESSIVE DISORDER, SINGLE EPISOD                     

 

 2017            FREDI FOFANA MD, Ot            K86.9       
     DISEASE OF PANCREAS, UNSPECIFIED                     

 

 2017            FREDI FOFANA MD, Ot            R59.0       
     LOCALIZED ENLARGED LYMPH NODES                     

 

 2017            FREDI FOFANA MD, Ot            Z68.42      
      BODY MASS INDEX (BMI) 45.0-49.9, ADULT                     

 

 2017            FREDI FOFANA MD, Ot            Z79.899     
       OTHER LONG TERM (CURRENT) DRUG THERAPY                     

 

 2018            FREDI FOFANA MD, Ot            C53.9       
     MALIGNANT NEOPLASM OF CERVIX UTERI, UNSP                     

 

 2018            FREDI FOFANA MD, Ot            D50.0       
     IRON DEFICIENCY ANEMIA SECONDARY TO BLOO                     

 

 2018            FREDI FOFANA MD, Ot            E11.9       
     TYPE 2 DIABETES MELLITUS WITHOUT COMPLIC                     

 

 2018            FREDI FOFANA MD, Ot            E66.01      
      MORBID (SEVERE) OBESITY DUE TO EXCESS CA                     

 

 2018            FREDI FOFANA MD, Ot            F17.210     
       NICOTINE DEPENDENCE, CIGARETTES, UNCOMPL                     

 

 2018            FREDI FOFANA MD, Ot            F32.9       
     MAJOR DEPRESSIVE DISORDER, SINGLE EPISOD                     

 

 2018            FREDI FOFANA MD, Ot            K86.9       
     DISEASE OF PANCREAS, UNSPECIFIED                     

 

 2018            FREDI FOFANA MD, Ot            R59.0       
     LOCALIZED ENLARGED LYMPH NODES                     

 

 2018            FREDI FOFANA MD, Ot            Z51.0       
     ENCOUNTER FOR ANTINEOPLASTIC RADIATION T                     

 

 2018            FREDI FOFANA MD, Ot            Z51.11      
      ENCOUNTER FOR ANTINEOPLASTIC CHEMOTHERAP                     

 

 2018            FREDI FOFANA MD, Ot            Z68.42      
      BODY MASS INDEX (BMI) 45.0-49.9, ADULT                     

 

 2018            FREDI FOFANA MD, Ot            Z79.899     
       OTHER LONG TERM (CURRENT) DRUG THERAPY                     

 

 01/10/2018            FREDI FOFANA MD, Ot            C53.9       
     MALIGNANT NEOPLASM OF CERVIX UTERI, UNSP                     

 

 01/10/2018            FREDI FOFANA MD, Ot            C77.5       
     SECONDARY AND UNSP MALIGNANT NEOPLASM OF                     

 

 01/10/2018            FREDI FOFANA MD, Ot            D50.0       
     IRON DEFICIENCY ANEMIA SECONDARY TO BLOO                     

 

 01/10/2018            FREDI FOFANA MD, Ot            E11.9       
     TYPE 2 DIABETES MELLITUS WITHOUT COMPLIC                     

 

 01/10/2018            FREDI FOFANA MD, Ot            E66.01      
      MORBID (SEVERE) OBESITY DUE TO EXCESS CA                     

 

 01/10/2018            FREDI FOFANA MD, Ot            F17.210     
       NICOTINE DEPENDENCE, CIGARETTES, UNCOMPL                     

 

 01/10/2018            FREDI FOFANA MD, Ot            F32.9       
     MAJOR DEPRESSIVE DISORDER, SINGLE EPISOD                     

 

 01/10/2018            FREDI FOFANA MD, Ot            K43.9       
     VENTRAL HERNIA WITHOUT OBSTRUCTION OR GA                     

 

 01/10/2018            FREDI FOFANA MD, Ot            K86.9       
     DISEASE OF PANCREAS, UNSPECIFIED                     

 

 01/10/2018            FREDI FOFANA MD, Ot            R59.0       
     LOCALIZED ENLARGED LYMPH NODES                     

 

 01/10/2018            FREDI FOFANA MD, Ot            Z51.0       
     ENCOUNTER FOR ANTINEOPLASTIC RADIATION T                     

 

 01/10/2018            FREDI FOFANA MD, Ot            Z68.42      
      BODY MASS INDEX (BMI) 45.0-49.9, ADULT                     

 

 01/10/2018            FREDI FOFANA MD, Ot            Z79.899     
       OTHER LONG TERM (CURRENT) DRUG THERAPY                     

 

 2018            FREDI FOFANA MD, Ot            C53.9       
     MALIGNANT NEOPLASM OF CERVIX UTERI, UNSP                     

 

 2018            FREDI FOFANA MD, Ot            D50.0       
     IRON DEFICIENCY ANEMIA SECONDARY TO BLOO                     

 

 2018            FREDI FOFANA MD, Ot            E11.9       
     TYPE 2 DIABETES MELLITUS WITHOUT COMPLIC                     

 

 2018            FREDI FOFANA MD, Ot            E66.01      
      MORBID (SEVERE) OBESITY DUE TO EXCESS CA                     

 

 2018            FREDI FOFANA MD, Ot            F17.210     
       NICOTINE DEPENDENCE, CIGARETTES, UNCOMPL                     

 

 2018            FREDI FOFANA MD, Ot            F32.9       
     MAJOR DEPRESSIVE DISORDER, SINGLE EPISOD                     

 

 2018            FREDI FOFANA MD, Ot            K86.9       
     DISEASE OF PANCREAS, UNSPECIFIED                     

 

 2018            FREDI FOFANA MD, Ot            R59.0       
     LOCALIZED ENLARGED LYMPH NODES                     

 

 2018            FREDI FOFANA MD, Ot            Z51.0       
     ENCOUNTER FOR ANTINEOPLASTIC RADIATION T                     

 

 2018            FREDI FOFANA MD, Ot            Z51.11      
      ENCOUNTER FOR ANTINEOPLASTIC CHEMOTHERAP                     

 

 2018            FREDI FOFANA MD, Ot            Z68.42      
      BODY MASS INDEX (BMI) 45.0-49.9, ADULT                     

 

 2018            FREDI FOFANA MD, Ot            Z79.899     
       OTHER LONG TERM (CURRENT) DRUG THERAPY                     

 

 2018            FREDI FOFANA MD, Ot            C53.9       
     MALIGNANT NEOPLASM OF CERVIX UTERI, UNSP                     

 

 2018            FREDI FOFANA MD, Ot            R59.0       
     LOCALIZED ENLARGED LYMPH NODES                     

 

 2018            FREDI FOFANA MD, Ot            Z96.0       
     PRESENCE OF UROGENITAL IMPLANTS                     

 

 2018            FREDI FOFANA MD, Ot            C53.9       
     MALIGNANT NEOPLASM OF CERVIX UTERI, UNSP                     

 

 2018            FREDI FOFANA MD, Ot            Z96.0       
     PRESENCE OF UROGENITAL IMPLANTS                     

 

 2018            FREDI FOFANA MD, Ot            C53.9       
     MALIGNANT NEOPLASM OF CERVIX UTERI, UNSP                     

 

 2018            FREDI FOFANA MD, Ot            Z96.0       
     PRESENCE OF UROGENITAL IMPLANTS                     

 

 2018            FREDI FOFANA MD, Ot            C53.9       
     MALIGNANT NEOPLASM OF CERVIX UTERI, UNSP                     

 

 2018            FREDI FOFANA MD, Ot            C77.5       
     SECONDARY AND UNSP MALIGNANT NEOPLASM OF                     

 

 2018            FREDI FOFANA MD, Ot            D50.0       
     IRON DEFICIENCY ANEMIA SECONDARY TO BLOO                     

 

 2018            FREDI FOFANA MD, Ot            E11.9       
     TYPE 2 DIABETES MELLITUS WITHOUT COMPLIC                     

 

 2018            FREDI FOFANA MD, Ot            E66.01      
      MORBID (SEVERE) OBESITY DUE TO EXCESS CA                     

 

 2018            FREDI FOFANA MD, Ot            F17.210     
       NICOTINE DEPENDENCE, CIGARETTES, UNCOMPL                     

 

 2018            FREDI FOFANA MD, Ot            F32.9       
     MAJOR DEPRESSIVE DISORDER, SINGLE EPISOD                     

 

 2018            FREDI FOFANA MD, Ot            K43.9       
     VENTRAL HERNIA WITHOUT OBSTRUCTION OR GA                     

 

 2018            FREDI FOFANA MD, Ot            K86.9       
     DISEASE OF PANCREAS, UNSPECIFIED                     

 

 2018            FREDI FOFANA MD, Ot            R59.0       
     LOCALIZED ENLARGED LYMPH NODES                     

 

 2018            FREDI FOFANA MD, Ot            Z51.0       
     ENCOUNTER FOR ANTINEOPLASTIC RADIATION T                     

 

 2018            FREDI FOFANA MD, Ot            Z68.42      
      BODY MASS INDEX (BMI) 45.0-49.9, ADULT                     

 

 2018            FREDI FOFANA MD, Ot            Z79.899     
       OTHER LONG TERM (CURRENT) DRUG THERAPY                     

 

 2018            FREDI FOFANA MD, Ot            C53.9       
     MALIGNANT NEOPLASM OF CERVIX UTERI, UNSP                     

 

 2018            FREDI FOFANA MD, Ot            Z96.0       
     PRESENCE OF UROGENITAL IMPLANTS                     

 

 2018            FREDI FOFANA MD, Ot            C53.9       
     MALIGNANT NEOPLASM OF CERVIX UTERI, UNSP                     

 

 2018            FREDI FOFANA MD, Ot            C77.5       
     SECONDARY AND UNSP MALIGNANT NEOPLASM OF                     

 

 2018            FREDI FOFANA MD, Ot            D50.0       
     IRON DEFICIENCY ANEMIA SECONDARY TO BLOO                     

 

 2018            FREDI FOFANA MD, Ot            E11.9       
     TYPE 2 DIABETES MELLITUS WITHOUT COMPLIC                     

 

 2018            FREDI FOFANA MD, Ot            E66.01      
      MORBID (SEVERE) OBESITY DUE TO EXCESS CA                     

 

 2018            FREDI FOFANA MD, Ot            F17.210     
       NICOTINE DEPENDENCE, CIGARETTES, UNCOMPL                     

 

 2018            FREDI FOFANA MD, Ot            F32.9       
     MAJOR DEPRESSIVE DISORDER, SINGLE EPISOD                     

 

 2018            FREDI FOFANA MD, Ot            K43.9       
     VENTRAL HERNIA WITHOUT OBSTRUCTION OR GA                     

 

 2018            FREDI FOFANA MD, Ot            K86.9       
     DISEASE OF PANCREAS, UNSPECIFIED                     

 

 2018            FREDI FOFANA MD, Ot            R59.0       
     LOCALIZED ENLARGED LYMPH NODES                     

 

 2018            FREDI FOFANA MD, Ot            Z45.2       
     ENCOUNTER FOR ADJUSTMENT AND MANAGEMENT                      

 

 2018            FREDI FOFANA MD, Ot            Z51.0       
     ENCOUNTER FOR ANTINEOPLASTIC RADIATION T                     

 

 2018            FREDI FOFANA MD, Ot            Z68.42      
      BODY MASS INDEX (BMI) 45.0-49.9, ADULT                     

 

 2018            FREDI FOFANA MD, Ot            Z79.899     
       OTHER LONG TERM (CURRENT) DRUG THERAPY                     

 

 04/10/2018            FREDI FOFANA MD, Ot            C53.9       
     MALIGNANT NEOPLASM OF CERVIX UTERI, UNSP                     

 

 04/10/2018            FREDI FOFANA MD, Ot            C77.5       
     SECONDARY AND UNSP MALIGNANT NEOPLASM OF                     

 

 04/10/2018            FREDI FOFANA MD, Ot            D50.0       
     IRON DEFICIENCY ANEMIA SECONDARY TO BLOO                     

 

 04/10/2018            FREDI FOFANA MD, Ot            E11.9       
     TYPE 2 DIABETES MELLITUS WITHOUT COMPLIC                     

 

 04/10/2018            FREDI FOFANA MD, Ot            E66.01      
      MORBID (SEVERE) OBESITY DUE TO EXCESS CA                     

 

 04/10/2018            FREDI FOFANA MD, Ot            F17.210     
       NICOTINE DEPENDENCE, CIGARETTES, UNCOMPL                     

 

 04/10/2018            FREDI FOFANA MD, Ot            F32.9       
     MAJOR DEPRESSIVE DISORDER, SINGLE EPISOD                     

 

 04/10/2018            FREDI FOFANA MD, Ot            K43.9       
     VENTRAL HERNIA WITHOUT OBSTRUCTION OR GA                     

 

 04/10/2018            FREDI FOFANA MD, Ot            K86.9       
     DISEASE OF PANCREAS, UNSPECIFIED                     

 

 04/10/2018            FREDI FOFANA MD, Ot            R59.0       
     LOCALIZED ENLARGED LYMPH NODES                     

 

 04/10/2018            FREDI FOFANA MD, Ot            Z45.2       
     ENCOUNTER FOR ADJUSTMENT AND MANAGEMENT                      

 

 04/10/2018            FREDI FOFANA MD, Ot            Z51.0       
     ENCOUNTER FOR ANTINEOPLASTIC RADIATION T                     

 

 04/10/2018            FREDI FOFANA MD, Ot            Z68.42      
      BODY MASS INDEX (BMI) 45.0-49.9, ADULT                     

 

 04/10/2018            FREDI FOFANA MD, Ot            Z79.899     
       OTHER LONG TERM (CURRENT) DRUG THERAPY                     

 

 2018            FREDI FOFANA MD, Ot            C53.9       
     MALIGNANT NEOPLASM OF CERVIX UTERI, UNSP                     

 

 2018            FREDI FOFANA MD, Ot            Z96.0       
     PRESENCE OF UROGENITAL IMPLANTS                     

 

 2018            FREDI FOFANA MD, Ot            C53.9       
     MALIGNANT NEOPLASM OF CERVIX UTERI, UNSP                     

 

 2018            FREDI FOFANA MD, Ot            C77.5       
     SECONDARY AND UNSP MALIGNANT NEOPLASM OF                     

 

 2018            FREDI FOFANA MD, Ot            D50.0       
     IRON DEFICIENCY ANEMIA SECONDARY TO BLOO                     

 

 2018            FREDI FOFANA MD, Ot            E11.9       
     TYPE 2 DIABETES MELLITUS WITHOUT COMPLIC                     

 

 2018            FREDI FOFANA MD, Ot            E66.01      
      MORBID (SEVERE) OBESITY DUE TO EXCESS CA                     

 

 2018            FREDI FOFANA MD, Ot            F17.210     
       NICOTINE DEPENDENCE, CIGARETTES, UNCOMPL                     

 

 2018            FREDI FOFANA MD, Ot            F32.9       
     MAJOR DEPRESSIVE DISORDER, SINGLE EPISOD                     

 

 2018            FREDI FOFANA MD, Ot            K43.9       
     VENTRAL HERNIA WITHOUT OBSTRUCTION OR GA                     

 

 2018            FREDI FOFANA MD, Ot            K86.9       
     DISEASE OF PANCREAS, UNSPECIFIED                     

 

 2018            FREDI FOFANA MD, Ot            R59.0       
     LOCALIZED ENLARGED LYMPH NODES                     

 

 2018            FREDI FOFANA MD, Ot            Z51.0       
     ENCOUNTER FOR ANTINEOPLASTIC RADIATION T                     

 

 2018            FREDI FOFANA MD, Ot            Z68.42      
      BODY MASS INDEX (BMI) 45.0-49.9, ADULT                     

 

 2018            FREDI FOFANA MD, Ot            Z79.899     
       OTHER LONG TERM (CURRENT) DRUG THERAPY                     

 

 2018            FREDI FOFANA MD, Ot            C53.9       
     MALIGNANT NEOPLASM OF CERVIX UTERI, UNSP                     

 

 2018            FREDI FOFANA MD, Ot            R16.2       
     HEPATOMEGALY WITH SPLENOMEGALY, NOT ELSE                     

 

 2018            FREDI FOFANA MD, Ot            R19.03      
      RIGHT LOWER QUADRANT ABDOMINAL SWELLING,                     

 

 2018            FREDI FOFANA MD, Ot            R59.0       
     LOCALIZED ENLARGED LYMPH NODES                     

 

 2018            FREDI FOFANA MD, Ot            T83.122A    
        DISPLACEMENT OF INDWELLING URETERAL STEN                     

 

 2018            FREDI FOFANA MD, Ot            Z96.0       
     PRESENCE OF UROGENITAL IMPLANTS                     

 

 2018            FREDI FOFANA MD, Ot            C53.9       
     MALIGNANT NEOPLASM OF CERVIX UTERI, UNSP                     

 

 2018            FREDI FOFANA MD, Ot            R16.2       
     HEPATOMEGALY WITH SPLENOMEGALY, NOT ELSE                     

 

 2018            FREDI FOFANA MD, Ot            R19.03      
      RIGHT LOWER QUADRANT ABDOMINAL SWELLING,                     

 

 2018            FREDI FOFANA MD, Ot            R59.0       
     LOCALIZED ENLARGED LYMPH NODES                     

 

 2018            FREDI FOFANA MD, Ot            T83.122A    
        DISPLACEMENT OF INDWELLING URETERAL STEN                     

 

 2018            FREDI FOFANA MD, Ot            Z96.0       
     PRESENCE OF UROGENITAL IMPLANTS                     

 

 05/10/2018            FREDI FOFANA MD, Ot            C53.9       
     MALIGNANT NEOPLASM OF CERVIX UTERI, UNSP                     

 

 05/10/2018            FREDI FOFANA MD, Ot            R16.2       
     HEPATOMEGALY WITH SPLENOMEGALY, NOT ELSE                     

 

 05/10/2018            FREDI FOFANA MD, Ot            R19.03      
      RIGHT LOWER QUADRANT ABDOMINAL SWELLING,                     

 

 05/10/2018            FREDI FOFANA MD, Ot            R59.0       
     LOCALIZED ENLARGED LYMPH NODES                     

 

 05/10/2018            FREDI FOFANA MD, Ot            T83.122A    
        DISPLACEMENT OF INDWELLING URETERAL STEN                     

 

 05/10/2018            CT DOUGLAS, FREDI            Ot            Z96.0       
     PRESENCE OF UROGENITAL IMPLANTS                     

 

 05/10/2018            EDWIN RUSSELL DO            Ot            Z01.818     
       ENCOUNTER FOR OTHER PREPROCEDURAL EXAMIN                     



                                                                               
                                                                               
                                                                               
                                                                               
                                                                               
                                                                               
                                                            



Procedures

      



There is no data.                  



Results

      





 Test            Result            Range        









 Urine beta human chorionic gonadotropin (hCG) measurement - 10/06/17 07:00    
     









 Urine beta human chorionic gonadotropin (hCG) measurement            NEGATIVE 
            NEGATIVE        









 Methicillin resistant Staphylococcus aureus (MRSA) screening culture - 10/06/
17 07:00         









 Methicillin resistant Staphylococcus aureus (MRSA) screening culture          
  NEG             NRG        









 MICROALBUMIN/CREATININE RATIO, URINE - 10/13/17 11:55         









 CREATININE, RANDOM URINE            174 mg/dL                    

 

 MICROALBUMIN            8.1 mg/dL            See Note:        

 

 MICROALBUMIN/CREATININE RATIO, RANDOM URINE            47 mcg/mg creat        
    <30        



                    



Encounters

      





 ACCT No.            Visit Date/Time            Discharge            Status    
        Pt. Type            Provider            Facility            Loc./Unit  
          Complaint        

 

 [id number]            2018 15:44:15            2018 23:59:59          
  CLS            Outpatient            Irineo Rush                         
                      

 

 [id number]            2018 12:53:31            2018 23:59:59          
  CLS            Outpatient            KIRSTEN Galindo                        
                       

 

 [id number]            2018 12:58:13            2018 23:59:59          
  CLS            Outpatient            NiniCarmelo garberyumiko Veronica                
                               

 

 [id number]            2018 18:21:30            2018 23:59:59          
  CLS            Outpatient            Irineo Rush                         
                      

 

 [id number]            2018 09:19:32            2018 23:59:59          
  CLS            Outpatient            Elizabeth, V S                                
               

 

 [id number]            01/10/2018 08:57:38            01/10/2018 23:59:59          
  CLS            Outpatient            Elizabeth, V S                                
               

 

 [id number]            2018 09:35:24            2018 23:59:59          
  CLS            Outpatient            Elizabeth, V S                                
               

 

 [id number]            2017 15:18:15            2017 23:59:59          
  CLS            Outpatient            Elizabeth, V S                                
               

 

 [id number]            2017 16:34:06            2017 23:59:59          
  CLS            Outpatient            KIRSTEN Galindo                        
                       

 

 [id number]            10/31/2017 10:42:06            10/31/2017 23:59:59          
  CLS            Outpatient            Elizabeth, V S                                
               

 

 [id number]            10/19/2017 09:11:47            10/19/2017 23:59:59          
  CLS            Outpatient            PRINCE Johnson S                                
               

 

 [id number]            10/03/2017 14:19:37            10/03/2017 23:59:59          
  CLS            Outpatient            PRINCE Johnson S                                
               

 

 [id number]            2017 17:03:18            2017 23:59:59          
  CLS            Outpatient            Arcadio Crain                
                               

 

 [id number]            2017 11:42:37            2017 23:59:59          
  CLS            Outpatient            Arcadio Crain                
                               

 

 [id number]            2017 15:39:54            2017 23:59:59          
  CLS            Outpatient            Arcadio Crain                
                               

 

 [id number]            2018 15:17:08                                      
Document Registration                                                          
  

 

 [id number]            2018 07:55:07                                      
Document Registration                                                          
  

 

 [id number]            2018 23:53:22                                      
Document Registration                                                          
  

 

 [id number]            2018 11:10:35                                      
Document Registration                                                          
  

 

 [id number]            2018 10:30:07                                      
Document Registration                                                          
  

 

 [id number]            2018 09:30:30                                      
Document Registration                                                          
  

 

 [id number]            2017 00:23:29                                      
Document Registration                                                          
  

 

 [id number]            10/03/2017 17:17:48                                      
Document Registration                                                          
  

 

 [id number]            10/03/2017 14:31:03                                      
Document Registration                                                          
  

 

 [id number]            2017 08:52:09                                      
Document Registration                                                          
  

 

 [id number]            2017 12:15:38                                      
Document Registration                                                          
  

 

 [id number]            2017 20:52:17                                      
Document Registration                                                          
  

 

 [id number]            2017 19:09:32                                      
Document Registration                                                          
  

 

 [id number]            05/10/2018 05:38:00            05/10/2018 13:04:00    
        DIS            Outpatient            EDWIN RUSSELL DO            [facility] 
[facility]            PREOP            CERVICAL CANCER        

 

 [id number]            2018 09:04:00            2018 23:59:59    
        CLS            Outpatient            FREDI FOFANA MD            [facility] 
[facility]            ONC                     

 

 [id number]            2018 11:11:00            2018 23:59:59    
        CLS            Outpatient            FREDI FOFANA MD            [facility] 
[facility]            RAD            C53.9 CANCER OF CERVIX  
      

 

 [id number]            2018 09:59:00            2018 00:01:00    
        DIS            Outpatient            FREDI FOFANA MD            [facility] 
[facility]            ONC                     

 

 [id number]            2018 09:41:00            2018 23:59:59    
        CLS            Outpatient            FREDI FOFANA MD            [facility]            RAD            CANCER OF CERVIX        

 

 [id number]            2018 08:13:00            2018 00:01:00    
        DIS            Outpatient            FREDI FOFANA MD            [facility] 
[facility]            ONC                     

 

 [id number]            10/24/2017 08:05:00            10/24/2017 23:59:59    
        CLS            Outpatient            FREDI FOFANA MD            [facility] 
[facility]            RAD            CERVIX CANCER        

 

 [id number]            10/17/2017 07:59:00            10/17/2017 23:59:59    
        CLS            Outpatient            FREDI FOFANA MD            [facility] 
[facility]            RAD            CANCER OF CERVIX C53.9  
      

 

 [id number]            10/06/2017 06:56:00            10/06/2017 10:04:00    
        DIS            Outpatient            EDWIN RUSSELL DO            [facility] 
[facility]            CERVICAL CANCER        

 

 [id number]            10/05/2017 05:44:00            10/05/2017 15:04:00    
        DIS            Outpatient            EDWIN RUSSELL DO            [facility] 
[facility]            PREOP            CERVICAL CANCER        

 

 [id number]            2017 10:32:00            2017 00:01:00    
        DIS            Outpatient            FREDI FOFANA MD            [facility] 
[facility]            ONC                     

 

 [id number]            2018 11:15:00                         PEN       
     Preadmit            EDWIN RUSSELL DO            [facility]            CERVICAL CANCER        

 

 [id number]            2018 09:00:00            2018 23:59:59          
  CLS            Outpatient            NAV GARCIA                     

 

 [id number]            10/13/2017 11:20:00                                      
Document Registration

## 2018-05-14 NOTE — ANESTHESIA-GENERAL POST-OP
General


Patient Condition


Mental Status/LOC:  Same as Preop


Cardiovascular:  Satisfactory


Nausea/Vomiting:  Absent


Respiratory:  Satisfactory


Pain:  Controlled


Complications:  Absent





Post Op Complications


Complications


None





Follow Up Care/Instructions


Patient Instructions


None needed.





Anesthesia/Patient Condition


Patient Condition


Patient is doing well, no complaints, stable vital signs, no apparent adverse 

anesthesia problems.   


No complications reported per nursing.


D/C home per [employer] [facility] Criteria:  No











RED LANDIN CRNA May 14, 2018 12:50

## 2018-05-14 NOTE — XMS REPORT
Patient Summary (HL7 CCD)

 Created on: 2017



VIANNEY CUNHA

External Reference #: [id number]

: 1975

Sex: Female



Demographics







 Address  [address] [place]  [address]

 

 Home Phone  [phone]

 

 Preferred Language  Unknown

 

 Marital Status  Unknown

 

 Mu-ism Affiliation  Unknown

 

 Race  White

 

 Ethnic Group  Not  or 





Author







 Author  PAUL MANZO

 

 Organization  Unknown

 

 Address  [address]

[place]  [address]



 

 Phone  [phone]







Care Team Providers







 Care Team Member Name  Role  Phone

 

 HENRIETTA FITZPATRICK MD  Attphys  [phone]

 

 HENRIETTA FITZPATRICK MD  Prisurg  [phone]



                                                                



Allergies

          





 Allergy        Code        Allergy Type        Reaction        Status    

 

 No Known Drug Allergies        0        Drug allergy                 Active    



                                                                               
         



Active Medications

          Unknown or Not Available.                                            
                                            



Problems

          Unknown or Not Available.                                            
                                            



Procedures

          





 Procedure        Code        Procedure Type        Date    

 

 CBC W/ AUTO DIFF (RFLX MAN DIFF IF IND)        2519836        SNOMED CT        
04/10/2017    

 

 PREGNANCY TEST URINE        [id number]        SNOMED CT        04/10/2017    

 

 ^CBC W/AUTO DIFF        0511733        SNOMED CT        04/10/2017    



                                                                               
                             



Results

          







 CBC W/ AUTO DIFF (RFLX MAN DIFF IF IND) - Collect Date/Time: 04/10/2017 21:47 
    

 

 Test Name        Code        Test Result        Test Units        Test Ref 
Range    

 

 WBC        43008-5        7.7        TH/CMM        L=4.5      H=10.8    

 

 RBC        789-8        4.27        ML/CMM        L=4.20     H=5.40    

 

 HGB        718-7        11.1        G/DL        L=12.0     H=16.0    

 

 HCT        4544-3        34.4        %        L=37.0     H=47.0    

 

 MCV                 81        FL        L=81       H=99    

 

 MCH                 26.0        PG        L=27.0     H=33.0    

 

 MCHC                 32.3        G/DL        L=31.0     H=36.0    

 

 RDW SD                 41        FL        L=36       H=50    

 

 RDW CV                 13.9        %        L=0.0      H=14.8    

 

 MPV                 9.4        FL        L=9.3      H=12.5    

 

 PLT        777-3        228        TH/CMM        L=130      H=440    

 

 NRBC#                 0.00        TH/CMM        L=0.00     H=0.00    

 

 NRBC%                 0.0        /100WBC        L=0.0      H=2.0    

 

 %NEUT                 70.7        %             

 

 %LYMP                 19.9        %             

 

 %MONO                 5.9        %             

 

 %EOS                 2.5        %             

 

 %BASO                 0.4        %             

 

 #NEUT                 5.47        TH/CMM        L=2.10     H=8.20    

 

 #LYMP                 1.54        TH/CMM        L=0.90     H=5.20    

 

 #MONO                 0.46        TH/CMM        L=0.16     H=1.00    

 

 #EOS                 0.19        TH/CMM        L=0.00     H=0.80    

 

 #BASO                 0.03        TH/CMM        L=0.00     H=0.20    

 

 MANUAL DIFF                 NOT IND        N/A             











 PT/PTT - Collect Date/Time: 04/10/2017 21:47     

 

 Test Name        Code        Test Result        Test Units        Test Ref 
Range    

 

 PROTIME        5964-2        10.8        SEC        L=9.9      H=11.9    

 

 INR        19207-4        1.0                      

 

 PTT        3173-2        25.8        SEC        L=22.2     H=37.2    











 PREGNANCY TEST URINE - Collect Date/Time: 04/10/2017 22:13     

 

 Test Name        Code        Test Result        Test Units        Test Ref 
Range    

 

 PREGNANCY TEST UR        2106-3        NEGATIVE        N/A             



                                                                               
                   



Function Status

          Unknown or Not Available.                                            
                                  



History of Immunizations

          Unknown or Not Available.                                            
                        



Plan of Treatment

          Unknown or Not Available.                                            
                        



Social History

          





 Smoking Status        Code        Start Date        End Date    

 

 Current every day smoker        [id number]                      



                                                                               
         



Vital Signs

          Unknown or Not Available.                                            
                        



Function Status

          Unknown or Not Available.                                            
    



Goals

          Unknown or Not Available.                                            
              



ASSESSMENTS

          Unknown or Not Available.                                            
                        



Health Concerns Section

          Unknown or Not Available.

## 2018-05-14 NOTE — PROGRESS NOTE-POST OPERATIVE
Post-Operative Progess Note


Surgeon (s)/Assistant (s)


Surgeon


EDWIN RUSSELL DO


Assistant:  Dr. Kee





Pre-Operative Diagnosis


cervical cancer, abdominal wall mass





Post-Operative Diagnosis





same





Procedure & Operative Findings


Date of Procedure


5/14/18


Procedure Performed/Findings


biopsy of subcutaneous mass and biopsy of intraperitoneal abdominal mass


Anesthesia Type


general





Estimated Blood Loss


Estimated blood loss (mL):  minimal





Specimens/Packing


Specimens Removed


subcutaneous mass and intraperitoneal abdominal mass











EDWIN RUSSELL DO May 14, 2018 13:24

## 2018-05-14 NOTE — PROGRESS NOTE-PRE OPERATIVE
Pre-Operative Progress Note


H&P Reviewed


The H&P was reviewed, patient examined and no changes noted.


Date Seen by Provider:  May 14, 2018


Time Seen by Provider:  11:05


Date H&P Reviewed:  May 14, 2018


Time H&P Reviewed:  11:05


Pre-Operative Diagnosis:  cervical cancer, abdominal wall mass











EDWIN RUSSELL DO May 14, 2018 11:05

## 2018-05-15 NOTE — OPERATIVE REPORT
DATE OF SERVICE:  05/14/2018



PREOPERATIVE DIAGNOSIS:

Cervical cancer and abdominal wall mass.



POSTOPERATIVE DIAGNOSIS:

Cervical cancer and abdominal wall mass.



PROCEDURE:

Biopsy of subcutaneous and intraperitoneal abdominal wall mass.



SURGEON:

Edwin Walker DO.



ASSISTANT:

Dr. Kee, to assist in retraction, dissection and closure, anatomy

identification.



ANESTHESIA:

General.



ESTIMATED BLOOD LOSS:

Minimal.



COMPLICATIONS:

None.



INDICATIONS:

The patient is a 42-year-old female with history of cervical cancer.  She has

new abdominal wall mass found.  She was sent by oncology for biopsy.  She

understands risks and benefits of procedure and wished to proceed with

procedure.  Consent was signed on chart.



DESCRIPTION OF PROCEDURE:

The patient was taken to the operating suite.  She was prepped and draped in

sterile fashion.  Surgical pause was performed.  A 5 cm incision was made just

inferior to the umbilicus over the palpable mass.  Lot of scar tissue and an

area of questionable mass in the subcutaneous tissue.  This portion was excised

and sent for pathology.  The fascia was encountered and still mass was able to

palpate underneath it.  Therefore, the abdominal cavity was then entered through

the fascia.  But directly after opening, the mass was adherent up to the

abdominal wall.  Approximately 1 cm circumference portion of the mass was then

excised and sent for frozen specimen to assure that tissue was mass that was

palpable and visualized on CT scan.  Pathology confirmed that this was likely

squamous cell in nature.  The fascia was then closed using 0 Vicryl in

figure-of-eight fashion.  The skin was then closed with staples.  The patient

tolerated the procedure well without any complications.  She was taken to

recovery room in stable condition.





Job ID: [id number]

DocumentID: [id number]

Dictated Date:  05/14/2018 13:30:14

Transcription Date: 05/14/2018 18:12:28

Dictated By: EDWIN WALKER DO

[facility]

## 2018-06-12 LAB
ALBUMIN SERPL-MCNC: 3.9 GM/DL (ref 3.2–4.5)
ALP SERPL-CCNC: 73 U/L (ref 40–136)
ALT SERPL-CCNC: 16 U/L (ref 0–55)
BASOPHILS # BLD AUTO: 0 10^3/UL (ref 0–0.1)
BASOPHILS NFR BLD AUTO: 0 % (ref 0–10)
BILIRUB SERPL-MCNC: 0.3 MG/DL (ref 0.1–1)
BUN/CREAT SERPL: 18
CALCIUM SERPL-MCNC: 9.7 MG/DL (ref 8.5–10.1)
CHLORIDE SERPL-SCNC: 101 MMOL/L (ref 98–107)
CO2 SERPL-SCNC: 21 MMOL/L (ref 21–32)
CREAT SERPL-MCNC: 0.82 MG/DL (ref 0.6–1.3)
EOSINOPHIL # BLD AUTO: 0 10^3/UL (ref 0–0.3)
EOSINOPHIL NFR BLD AUTO: 0 % (ref 0–10)
ERYTHROCYTE [DISTWIDTH] IN BLOOD BY AUTOMATED COUNT: 14.8 % (ref 10–14.5)
GFR SERPLBLD BASED ON 1.73 SQ M-ARVRAT: > 60 ML/MIN
GLUCOSE SERPL-MCNC: 387 MG/DL (ref 70–105)
HCT VFR BLD CALC: 34 % (ref 35–52)
HGB BLD-MCNC: 11.9 G/DL (ref 11.5–16)
LYMPHOCYTES # BLD AUTO: 0.3 X 10^3 (ref 1–4)
LYMPHOCYTES NFR BLD AUTO: 5 % (ref 12–44)
MANUAL DIFFERENTIAL PERFORMED BLD QL: NO
MCH RBC QN AUTO: 28 PG (ref 25–34)
MCHC RBC AUTO-ENTMCNC: 35 G/DL (ref 32–36)
MCV RBC AUTO: 80 FL (ref 80–99)
MONOCYTES # BLD AUTO: 0 X 10^3 (ref 0–1)
MONOCYTES NFR BLD AUTO: 1 % (ref 0–12)
NEUTROPHILS # BLD AUTO: 5.6 X 10^3 (ref 1.8–7.8)
NEUTROPHILS NFR BLD AUTO: 95 % (ref 42–75)
PLATELET # BLD: 260 10^3/UL (ref 130–400)
PMV BLD AUTO: 8.9 FL (ref 7.4–10.4)
POTASSIUM SERPL-SCNC: 4.7 MMOL/L (ref 3.6–5)
PROT SERPL-MCNC: 7.5 GM/DL (ref 6.4–8.2)
RBC # BLD AUTO: 4.26 10^6/UL (ref 4.35–5.85)
SODIUM SERPL-SCNC: 134 MMOL/L (ref 135–145)
WBC # BLD AUTO: 6 10^3/UL (ref 4.3–11)

## 2018-07-03 LAB
ALBUMIN SERPL-MCNC: 3.7 GM/DL (ref 3.2–4.5)
ALP SERPL-CCNC: 77 U/L (ref 40–136)
ALT SERPL-CCNC: 12 U/L (ref 0–55)
BASOPHILS # BLD AUTO: 0 10^3/UL (ref 0–0.1)
BASOPHILS NFR BLD AUTO: 0 % (ref 0–10)
BILIRUB SERPL-MCNC: 0.3 MG/DL (ref 0.1–1)
BUN/CREAT SERPL: 17
CALCIUM SERPL-MCNC: 9.1 MG/DL (ref 8.5–10.1)
CHLORIDE SERPL-SCNC: 106 MMOL/L (ref 98–107)
CO2 SERPL-SCNC: 20 MMOL/L (ref 21–32)
CREAT SERPL-MCNC: 0.76 MG/DL (ref 0.6–1.3)
EOSINOPHIL # BLD AUTO: 0 10^3/UL (ref 0–0.3)
EOSINOPHIL NFR BLD AUTO: 0 % (ref 0–10)
ERYTHROCYTE [DISTWIDTH] IN BLOOD BY AUTOMATED COUNT: 15.2 % (ref 10–14.5)
GFR SERPLBLD BASED ON 1.73 SQ M-ARVRAT: > 60 ML/MIN
GLUCOSE SERPL-MCNC: 307 MG/DL (ref 70–105)
HCT VFR BLD CALC: 34 % (ref 35–52)
HGB BLD-MCNC: 11.4 G/DL (ref 11.5–16)
LYMPHOCYTES # BLD AUTO: 0.3 X 10^3 (ref 1–4)
LYMPHOCYTES NFR BLD AUTO: 5 % (ref 12–44)
MAGNESIUM SERPL-MCNC: 1.5 MG/DL (ref 1.8–2.4)
MANUAL DIFFERENTIAL PERFORMED BLD QL: NO
MCH RBC QN AUTO: 26 PG (ref 25–34)
MCHC RBC AUTO-ENTMCNC: 34 G/DL (ref 32–36)
MCV RBC AUTO: 79 FL (ref 80–99)
MONOCYTES # BLD AUTO: 0 X 10^3 (ref 0–1)
MONOCYTES NFR BLD AUTO: 1 % (ref 0–12)
NEUTROPHILS # BLD AUTO: 5.5 X 10^3 (ref 1.8–7.8)
NEUTROPHILS NFR BLD AUTO: 94 % (ref 42–75)
PLATELET # BLD: 226 10^3/UL (ref 130–400)
PMV BLD AUTO: 8.9 FL (ref 7.4–10.4)
POTASSIUM SERPL-SCNC: 4.2 MMOL/L (ref 3.6–5)
PROT SERPL-MCNC: 6.7 GM/DL (ref 6.4–8.2)
RBC # BLD AUTO: 4.33 10^6/UL (ref 4.35–5.85)
SODIUM SERPL-SCNC: 136 MMOL/L (ref 135–145)
WBC # BLD AUTO: 5.9 10^3/UL (ref 4.3–11)

## 2018-07-24 ENCOUNTER — HOSPITAL ENCOUNTER (OUTPATIENT)
Dept: HOSPITAL 75 - ONC | Age: 43
LOS: 8 days | Discharge: HOME | End: 2018-08-01
Attending: INTERNAL MEDICINE
Payer: MEDICAID

## 2018-07-24 VITALS — WEIGHT: 262 LBS | BODY MASS INDEX: 41.12 KG/M2 | HEIGHT: 67 IN

## 2018-07-24 DIAGNOSIS — C53.9: ICD-10-CM

## 2018-07-24 DIAGNOSIS — C77.5: ICD-10-CM

## 2018-07-24 DIAGNOSIS — R59.0: ICD-10-CM

## 2018-07-24 DIAGNOSIS — Z51.11: Primary | ICD-10-CM

## 2018-07-24 DIAGNOSIS — D50.0: ICD-10-CM

## 2018-07-24 DIAGNOSIS — K86.9: ICD-10-CM

## 2018-07-24 DIAGNOSIS — F17.210: ICD-10-CM

## 2018-07-24 DIAGNOSIS — K43.9: ICD-10-CM

## 2018-07-24 DIAGNOSIS — F32.9: ICD-10-CM

## 2018-07-24 DIAGNOSIS — Z79.899: ICD-10-CM

## 2018-07-24 DIAGNOSIS — E66.01: ICD-10-CM

## 2018-07-24 DIAGNOSIS — E11.9: ICD-10-CM

## 2018-07-24 LAB
ALBUMIN SERPL-MCNC: 3.9 GM/DL (ref 3.2–4.5)
ALP SERPL-CCNC: 85 U/L (ref 40–136)
ALT SERPL-CCNC: 14 U/L (ref 0–55)
BASOPHILS # BLD AUTO: 0 10^3/UL (ref 0–0.1)
BASOPHILS NFR BLD AUTO: 0 % (ref 0–10)
BILIRUB SERPL-MCNC: 0.4 MG/DL (ref 0.1–1)
BUN/CREAT SERPL: 18
CALCIUM SERPL-MCNC: 9.8 MG/DL (ref 8.5–10.1)
CHLORIDE SERPL-SCNC: 101 MMOL/L (ref 98–107)
CO2 SERPL-SCNC: 23 MMOL/L (ref 21–32)
CREAT SERPL-MCNC: 0.84 MG/DL (ref 0.6–1.3)
EOSINOPHIL # BLD AUTO: 0 10^3/UL (ref 0–0.3)
EOSINOPHIL NFR BLD AUTO: 0 % (ref 0–10)
ERYTHROCYTE [DISTWIDTH] IN BLOOD BY AUTOMATED COUNT: 16.7 % (ref 10–14.5)
GFR SERPLBLD BASED ON 1.73 SQ M-ARVRAT: > 60 ML/MIN
GLUCOSE SERPL-MCNC: 355 MG/DL (ref 70–105)
HCT VFR BLD CALC: 33 % (ref 35–52)
HGB BLD-MCNC: 11.1 G/DL (ref 11.5–16)
LYMPHOCYTES # BLD AUTO: 0.3 X 10^3 (ref 1–4)
LYMPHOCYTES NFR BLD AUTO: 6 % (ref 12–44)
MAGNESIUM SERPL-MCNC: 1.1 MG/DL (ref 1.8–2.4)
MANUAL DIFFERENTIAL PERFORMED BLD QL: NO
MCH RBC QN AUTO: 27 PG (ref 25–34)
MCHC RBC AUTO-ENTMCNC: 34 G/DL (ref 32–36)
MCV RBC AUTO: 80 FL (ref 80–99)
MONOCYTES # BLD AUTO: 0 X 10^3 (ref 0–1)
MONOCYTES NFR BLD AUTO: 0 % (ref 0–12)
NEUTROPHILS # BLD AUTO: 5.1 X 10^3 (ref 1.8–7.8)
NEUTROPHILS NFR BLD AUTO: 94 % (ref 42–75)
PLATELET # BLD: 179 10^3/UL (ref 130–400)
PMV BLD AUTO: 9.2 FL (ref 7.4–10.4)
POTASSIUM SERPL-SCNC: 4.3 MMOL/L (ref 3.6–5)
PROT SERPL-MCNC: 7.4 GM/DL (ref 6.4–8.2)
RBC # BLD AUTO: 4.09 10^6/UL (ref 4.35–5.85)
SODIUM SERPL-SCNC: 135 MMOL/L (ref 135–145)
WBC # BLD AUTO: 5.4 10^3/UL (ref 4.3–11)

## 2018-07-24 PROCEDURE — 96367 TX/PROPH/DG ADDL SEQ IV INF: CPT

## 2018-07-24 PROCEDURE — 80053 COMPREHEN METABOLIC PANEL: CPT

## 2018-07-24 PROCEDURE — 83735 ASSAY OF MAGNESIUM: CPT

## 2018-07-24 PROCEDURE — 36591 DRAW BLOOD OFF VENOUS DEVICE: CPT

## 2018-07-24 PROCEDURE — 96417 CHEMO IV INFUS EACH ADDL SEQ: CPT

## 2018-07-24 PROCEDURE — 96375 TX/PRO/DX INJ NEW DRUG ADDON: CPT

## 2018-07-24 PROCEDURE — 96413 CHEMO IV INFUSION 1 HR: CPT

## 2018-07-24 PROCEDURE — 96415 CHEMO IV INFUSION ADDL HR: CPT

## 2018-07-24 PROCEDURE — 86304 IMMUNOASSAY TUMOR CA 125: CPT

## 2018-07-24 PROCEDURE — 85025 COMPLETE CBC W/AUTO DIFF WBC: CPT

## 2018-07-24 PROCEDURE — 36593 DECLOT VASCULAR DEVICE: CPT

## 2018-07-24 PROCEDURE — 99213 OFFICE O/P EST LOW 20 MIN: CPT

## 2018-08-09 ENCOUNTER — HOSPITAL ENCOUNTER (OUTPATIENT)
Dept: HOSPITAL 75 - RAD | Age: 43
End: 2018-08-09
Attending: INTERNAL MEDICINE
Payer: MEDICAID

## 2018-08-09 DIAGNOSIS — C79.89: ICD-10-CM

## 2018-08-09 DIAGNOSIS — C53.9: Primary | ICD-10-CM

## 2018-08-09 PROCEDURE — 71260 CT THORAX DX C+: CPT

## 2018-08-09 PROCEDURE — 74177 CT ABD & PELVIS W/CONTRAST: CPT

## 2018-08-09 NOTE — DIAGNOSTIC IMAGING REPORT
PROCEDURE: CT chest, abdomen, and pelvis with contrast.



TECHNIQUE: Multiple contiguous axial images were obtained through

the chest, abdomen, and pelvis after the administration of

intravenous contrast.



INDICATION:  Cervical carcinoma, followup.



Comparison is made with prior CT from 05/04/2018.



FINDINGS: Right chest wall port remains in place. No axillary

lymphadenopathy is seen. Superior mediastinal lymph node between

the left common carotid artery and subclavian artery measures 9

mm compared with 8 mm. No new or enlarging lymph nodes are seen.

Wen are unremarkable. There is a questionable filling defect

identified in the right descending pulmonary artery, suspicious

for pulmonary embolism. Small filling defect in the right upper

lobe pulmonary artery is also suspected. There is very poor

opacification of the pulmonary arterial system so the study is

limited for evaluation of pulmonary emboli. No pericardial or

pleural fluid is seen. No pulmonary nodules, infiltrates or

masses are identified. Bony structures are unremarkable.



IMPRESSION:

1. Stable superior mediastinal lymph node when compared with exam

from 05/04/2018. No new or enlarging thoracic lymphadenopathy or

evidence of pulmonary metastatic disease is seen.

2. Questionable filling defect and right-sided pulmonary

arteries, suspicious for PE. Dedicated CTA chest PE study would

be recommended for further evaluation.



Dr. Hernandez, Department of Oncology was notified of these results.



CT abdomen and pelvis:



The liver and spleen remain enlarged. No discrete mass is

identified. The gallbladder is contracted. The pancreas is

unremarkable. No adrenal mass is identified. The right kidney is

unremarkable. Left-sided double-J nephroureteral stent has been

repositioned and now has a proximal portion in the left renal

pelvis and the distal portion within the urinary bladder. The

aorta is nonaneurysmal. The previously noted enlarged lymph node

at the aortic bifurcation has decreased in size, now measuring

1.9 cm x 1.3 cm compared with 2.5 cm x 1.9 cm on prior. Left

iliac node continues to decrease in size as well measuring 1.0 x

0.8 cm compared with 1.3 x 1.0 cm. No inguinal lymphadenopathy is

seen. The previously noted abnormal density in the lower anterior

abdomen is again noted and appears larger and remain suggestive

of omental caking and peritoneal implants. Abnormal soft tissue

is interspersed with bowel loops and extends over a distance of

approximately 18 cm x 6 cm compared with 14 cm x 6 cm. No bowel

obstruction is seen. There is no ascites. There is moderate stool

in the colon. Bony structures does show some sclerosis of the

sacroiliac joints bilaterally suggesting sacroiliitis. This is

similar to prior exam.



IMPRESSION:

1. Decrease in size of central retroperitoneal lymphadenopathy at

the aortic bifurcation as well as in the left iliac

lymphadenopathy when compared with study from 05/04/2018.

2. Increasing soft tissue in the lower anterior abdomen

suggestive of worsening omental caking and peritoneal implants.



Dictated by: 



  Dictated on workstation # [id number]

## 2018-08-10 ENCOUNTER — HOSPITAL ENCOUNTER (OUTPATIENT)
Dept: HOSPITAL 75 - CARD | Age: 43
End: 2018-08-10
Attending: INTERNAL MEDICINE
Payer: MEDICAID

## 2018-08-10 DIAGNOSIS — R93.8: Primary | ICD-10-CM

## 2018-08-10 PROCEDURE — 78582 LUNG VENTILAT&PERFUS IMAGING: CPT

## 2018-08-10 NOTE — DIAGNOSTIC IMAGING REPORT
Indication: Abnormal recent CT raising question of right-sided

pulmonary emboli.



The patient was administered 1.1 mCi technetium 99m aerosolized

DTPA and imaging of the chest was performed. Next, the patient

was administered 5.5 mCi technetium 99M MAA intravenously and

imaging of the chest was performed in multiple obliquities.



Correlation is made with CT study one day earlier.



Normal clumping of DTPA in the julio is seen on the ventilation

images. Appears to be fairly homogeneous ventilation in both

lungs. No ventilation defect is seen. Perfusion images

demonstrate fairly homogeneous perfusion to both lungs. No

definite wedge-shaped or pleural-based perfusion defect is seen.



Impression: Findings consistent with low probability for

pulmonary aneurysm.



Dictated by: 



  Dictated on workstation # [id number]

## 2018-08-14 LAB
ALBUMIN SERPL-MCNC: 3.8 GM/DL (ref 3.2–4.5)
ALP SERPL-CCNC: 83 U/L (ref 40–136)
ALT SERPL-CCNC: 13 U/L (ref 0–55)
BASOPHILS # BLD AUTO: 0 10^3/UL (ref 0–0.1)
BASOPHILS NFR BLD AUTO: 0 % (ref 0–10)
BILIRUB SERPL-MCNC: 0.4 MG/DL (ref 0.1–1)
BUN/CREAT SERPL: 15
CALCIUM SERPL-MCNC: 9.7 MG/DL (ref 8.5–10.1)
CHLORIDE SERPL-SCNC: 105 MMOL/L (ref 98–107)
CO2 SERPL-SCNC: 22 MMOL/L (ref 21–32)
CREAT SERPL-MCNC: 0.73 MG/DL (ref 0.6–1.3)
EOSINOPHIL # BLD AUTO: 0 10^3/UL (ref 0–0.3)
EOSINOPHIL NFR BLD AUTO: 0 % (ref 0–10)
ERYTHROCYTE [DISTWIDTH] IN BLOOD BY AUTOMATED COUNT: 17.9 % (ref 10–14.5)
GFR SERPLBLD BASED ON 1.73 SQ M-ARVRAT: > 60 ML/MIN
GLUCOSE SERPL-MCNC: 173 MG/DL (ref 70–105)
HCT VFR BLD CALC: 32 % (ref 35–52)
HGB BLD-MCNC: 10.2 G/DL (ref 11.5–16)
LYMPHOCYTES # BLD AUTO: 0.3 X 10^3 (ref 1–4)
LYMPHOCYTES NFR BLD AUTO: 4 % (ref 12–44)
MAGNESIUM SERPL-MCNC: 1.1 MG/DL (ref 1.8–2.4)
MANUAL DIFFERENTIAL PERFORMED BLD QL: NO
MCH RBC QN AUTO: 26 PG (ref 25–34)
MCHC RBC AUTO-ENTMCNC: 32 G/DL (ref 32–36)
MCV RBC AUTO: 81 FL (ref 80–99)
MONOCYTES # BLD AUTO: 0.1 X 10^3 (ref 0–1)
MONOCYTES NFR BLD AUTO: 1 % (ref 0–12)
NEUTROPHILS # BLD AUTO: 6.6 X 10^3 (ref 1.8–7.8)
NEUTROPHILS NFR BLD AUTO: 95 % (ref 42–75)
PLATELET # BLD: 190 10^3/UL (ref 130–400)
PMV BLD AUTO: 9.3 FL (ref 7.4–10.4)
POTASSIUM SERPL-SCNC: 4.2 MMOL/L (ref 3.6–5)
PROT SERPL-MCNC: 6.8 GM/DL (ref 6.4–8.2)
RBC # BLD AUTO: 3.87 10^6/UL (ref 4.35–5.85)
SODIUM SERPL-SCNC: 137 MMOL/L (ref 135–145)
WBC # BLD AUTO: 7 10^3/UL (ref 4.3–11)

## 2018-09-04 LAB
ALBUMIN SERPL-MCNC: 3.7 GM/DL (ref 3.2–4.5)
ALP SERPL-CCNC: 82 U/L (ref 40–136)
ALT SERPL-CCNC: 14 U/L (ref 0–55)
BASOPHILS # BLD AUTO: 0 10^3/UL (ref 0–0.1)
BASOPHILS NFR BLD AUTO: 0 % (ref 0–10)
BILIRUB SERPL-MCNC: 0.5 MG/DL (ref 0.1–1)
BUN/CREAT SERPL: 13
CALCIUM SERPL-MCNC: 8.8 MG/DL (ref 8.5–10.1)
CHLORIDE SERPL-SCNC: 101 MMOL/L (ref 98–107)
CO2 SERPL-SCNC: 25 MMOL/L (ref 21–32)
CREAT SERPL-MCNC: 0.76 MG/DL (ref 0.6–1.3)
EOSINOPHIL # BLD AUTO: 0 10^3/UL (ref 0–0.3)
EOSINOPHIL NFR BLD AUTO: 0 % (ref 0–10)
ERYTHROCYTE [DISTWIDTH] IN BLOOD BY AUTOMATED COUNT: 20.2 % (ref 10–14.5)
GFR SERPLBLD BASED ON 1.73 SQ M-ARVRAT: > 60 ML/MIN
GLUCOSE SERPL-MCNC: 209 MG/DL (ref 70–105)
HCT VFR BLD CALC: 32 % (ref 35–52)
HGB BLD-MCNC: 10.1 G/DL (ref 11.5–16)
LYMPHOCYTES # BLD AUTO: 0.3 X 10^3 (ref 1–4)
LYMPHOCYTES NFR BLD AUTO: 5 % (ref 12–44)
MAGNESIUM SERPL-MCNC: 1.3 MG/DL (ref 1.8–2.4)
MANUAL DIFFERENTIAL PERFORMED BLD QL: NO
MCH RBC QN AUTO: 27 PG (ref 25–34)
MCHC RBC AUTO-ENTMCNC: 32 G/DL (ref 32–36)
MCV RBC AUTO: 85 FL (ref 80–99)
MONOCYTES # BLD AUTO: 0.1 X 10^3 (ref 0–1)
MONOCYTES NFR BLD AUTO: 1 % (ref 0–12)
NEUTROPHILS # BLD AUTO: 5.3 X 10^3 (ref 1.8–7.8)
NEUTROPHILS NFR BLD AUTO: 95 % (ref 42–75)
PLATELET # BLD: 152 10^3/UL (ref 130–400)
PMV BLD AUTO: 8.7 FL (ref 7.4–10.4)
POTASSIUM SERPL-SCNC: 3.7 MMOL/L (ref 3.6–5)
PROT SERPL-MCNC: 6.4 GM/DL (ref 6.4–8.2)
RBC # BLD AUTO: 3.72 10^6/UL (ref 4.35–5.85)
SODIUM SERPL-SCNC: 137 MMOL/L (ref 135–145)
WBC # BLD AUTO: 5.6 10^3/UL (ref 4.3–11)

## 2018-09-25 ENCOUNTER — HOSPITAL ENCOUNTER (OUTPATIENT)
Dept: HOSPITAL 75 - ONC | Age: 43
LOS: 9 days | Discharge: HOME | End: 2018-10-04
Attending: INTERNAL MEDICINE
Payer: MEDICAID

## 2018-09-25 VITALS — BODY MASS INDEX: 38.14 KG/M2 | HEIGHT: 67 IN | WEIGHT: 243 LBS

## 2018-09-25 DIAGNOSIS — Z79.899: ICD-10-CM

## 2018-09-25 DIAGNOSIS — R59.0: ICD-10-CM

## 2018-09-25 DIAGNOSIS — F32.9: ICD-10-CM

## 2018-09-25 DIAGNOSIS — Z51.11: Primary | ICD-10-CM

## 2018-09-25 DIAGNOSIS — F17.210: ICD-10-CM

## 2018-09-25 DIAGNOSIS — K86.9: ICD-10-CM

## 2018-09-25 DIAGNOSIS — K43.9: ICD-10-CM

## 2018-09-25 DIAGNOSIS — D50.0: ICD-10-CM

## 2018-09-25 DIAGNOSIS — E11.9: ICD-10-CM

## 2018-09-25 DIAGNOSIS — E66.01: ICD-10-CM

## 2018-09-25 DIAGNOSIS — C77.5: ICD-10-CM

## 2018-09-25 DIAGNOSIS — C53.9: ICD-10-CM

## 2018-09-25 LAB
ALBUMIN SERPL-MCNC: 3.5 GM/DL (ref 3.2–4.5)
ALP SERPL-CCNC: 82 U/L (ref 40–136)
ALT SERPL-CCNC: 10 U/L (ref 0–55)
BASOPHILS # BLD AUTO: 0 10^3/UL (ref 0–0.1)
BASOPHILS NFR BLD AUTO: 0 % (ref 0–10)
BILIRUB SERPL-MCNC: 0.5 MG/DL (ref 0.1–1)
BUN/CREAT SERPL: 15
CALCIUM SERPL-MCNC: 8.5 MG/DL (ref 8.5–10.1)
CHLORIDE SERPL-SCNC: 103 MMOL/L (ref 98–107)
CO2 SERPL-SCNC: 25 MMOL/L (ref 21–32)
CREAT SERPL-MCNC: 0.65 MG/DL (ref 0.6–1.3)
EOSINOPHIL # BLD AUTO: 0 10^3/UL (ref 0–0.3)
EOSINOPHIL NFR BLD AUTO: 0 % (ref 0–10)
ERYTHROCYTE [DISTWIDTH] IN BLOOD BY AUTOMATED COUNT: 20.2 % (ref 10–14.5)
GFR SERPLBLD BASED ON 1.73 SQ M-ARVRAT: > 60 ML/MIN
GLUCOSE SERPL-MCNC: 162 MG/DL (ref 70–105)
HCT VFR BLD CALC: 29 % (ref 35–52)
HGB BLD-MCNC: 9.6 G/DL (ref 11.5–16)
LYMPHOCYTES # BLD AUTO: 0.3 X 10^3 (ref 1–4)
LYMPHOCYTES NFR BLD AUTO: 7 % (ref 12–44)
MAGNESIUM SERPL-MCNC: 1 MG/DL (ref 1.8–2.4)
MANUAL DIFFERENTIAL PERFORMED BLD QL: NO
MCH RBC QN AUTO: 29 PG (ref 25–34)
MCHC RBC AUTO-ENTMCNC: 33 G/DL (ref 32–36)
MCV RBC AUTO: 88 FL (ref 80–99)
MONOCYTES # BLD AUTO: 0 X 10^3 (ref 0–1)
MONOCYTES NFR BLD AUTO: 1 % (ref 0–12)
NEUTROPHILS # BLD AUTO: 3.5 X 10^3 (ref 1.8–7.8)
NEUTROPHILS NFR BLD AUTO: 92 % (ref 42–75)
PLATELET # BLD: 146 10^3/UL (ref 130–400)
PMV BLD AUTO: 10 FL (ref 7.4–10.4)
POTASSIUM SERPL-SCNC: 4.1 MMOL/L (ref 3.6–5)
PROT SERPL-MCNC: 6.2 GM/DL (ref 6.4–8.2)
RBC # BLD AUTO: 3.36 10^6/UL (ref 4.35–5.85)
SODIUM SERPL-SCNC: 138 MMOL/L (ref 135–145)
WBC # BLD AUTO: 3.8 10^3/UL (ref 4.3–11)

## 2018-09-25 PROCEDURE — 96368 THER/DIAG CONCURRENT INF: CPT

## 2018-09-25 PROCEDURE — 85025 COMPLETE CBC W/AUTO DIFF WBC: CPT

## 2018-09-25 PROCEDURE — 36591 DRAW BLOOD OFF VENOUS DEVICE: CPT

## 2018-09-25 PROCEDURE — 96375 TX/PRO/DX INJ NEW DRUG ADDON: CPT

## 2018-09-25 PROCEDURE — 80053 COMPREHEN METABOLIC PANEL: CPT

## 2018-09-25 PROCEDURE — 96413 CHEMO IV INFUSION 1 HR: CPT

## 2018-09-25 PROCEDURE — 96417 CHEMO IV INFUS EACH ADDL SEQ: CPT

## 2018-09-25 PROCEDURE — 96415 CHEMO IV INFUSION ADDL HR: CPT

## 2018-09-25 PROCEDURE — 96367 TX/PROPH/DG ADDL SEQ IV INF: CPT

## 2018-09-25 PROCEDURE — 83735 ASSAY OF MAGNESIUM: CPT

## 2018-10-18 ENCOUNTER — HOSPITAL ENCOUNTER (OUTPATIENT)
Dept: HOSPITAL 75 - RAD | Age: 43
End: 2018-10-18
Attending: NURSE PRACTITIONER
Payer: MEDICAID

## 2018-10-18 DIAGNOSIS — Z96.89: ICD-10-CM

## 2018-10-18 DIAGNOSIS — C78.6: ICD-10-CM

## 2018-10-18 DIAGNOSIS — R59.0: ICD-10-CM

## 2018-10-18 DIAGNOSIS — C53.9: Primary | ICD-10-CM

## 2018-10-18 PROCEDURE — 71260 CT THORAX DX C+: CPT

## 2018-10-18 PROCEDURE — 74178 CT ABD&PLV WO CNTR FLWD CNTR: CPT

## 2018-10-18 NOTE — DIAGNOSTIC IMAGING REPORT
PROCEDURE: CT chest with contrast, CT abdomen and pelvis with and

without contrast.



TECHNIQUE: Pre and post intravenous contrast axial imaging of the

abdomen and pelvis and post contrast axial imaging of the chest

were performed.



INDICATION: Cervical carcinoma with peritoneal carcinomatosis.

Patient stopped chemotherapy 3 weeks ago, now complaining of

right-sided abdominal pressure and nausea and vomiting.



Comparison is made with prior CT from 08/09/2018.



CT chest:



FINDINGS: Right chest wall port remains in place with tip within

the SVC. No axillary lymphadenopathy is identified. Small lymph

node in the superior mediastinum measures 7 mm short axis

compared with 9 mm. No other mediastinal lymph nodes are seen.

The julio are unremarkable. No pericardial or pleural fluid is

detected. No pulmonary nodules, masses or infiltrates are seen.



IMPRESSION: Unremarkable CT of the chest.



CT abdomen and pelvis:



FINDINGS: Diffuse low density throughout the liver is seen

consistent with hepatic steatosis. No discrete liver mass is

identified. The gallbladder appears contracted. The pancreas and

spleen are unremarkable. No adrenal mass is identified. Left

kidney again contains a double-J nephroureteral stent extending

into the bladder. There continues to be reduction in size of

lymph nodes in the central retroperitoneum. Left periaortic

marker node measures 15 mm x 11 mm compared with 19 mm x 14 mm.

However, patient has developed significant fluid-filled

distention of small bowel loops throughout the abdomen. There

appears to be a focal transition in the anterior lower abdomen at

the site of previously described peritoneal soft tissue masses. A

soft tissue mass is difficult to measure and infiltrative amongst

bowel loops but is approximately 19 cm x 7 cm compared with 18 cm

x 6 cm. Nodularity along the surface of the inferior right lobe

of the liver is noted consistent with peritoneal implants. A

confluence of nodules at this location measures 6.0 x 3.4 cm.

Abnormal soft tissue in the midline pelvis just above the dome of

the bladder is also seen suggestive of implants. No inguinal or

iliac lymphadenopathy is seen. Uterus is unremarkable.



IMPRESSION: Development of significant fluid-filled distention of

small bowel loops throughout the abdomen and pelvis with

transition distally, consistent with small bowel obstruction. The

transition appears to be in the region of abnormal soft tissue in

the low anterior abdomen at the site of patient's known

peritoneal implants. Implants are noted elsewhere, particularly

along the surface of the liver inferiorly as well as in the low

midline of the pelvis. The central retroperitoneal adenopathy has

improved since prior exam from 08/09/2018.



Lupe Pascal nurse practitioner was called with these results.



Dictated by: 



  Dictated on workstation # [id number]
